# Patient Record
Sex: MALE | Race: BLACK OR AFRICAN AMERICAN
[De-identification: names, ages, dates, MRNs, and addresses within clinical notes are randomized per-mention and may not be internally consistent; named-entity substitution may affect disease eponyms.]

---

## 2017-02-11 ENCOUNTER — HOSPITAL ENCOUNTER (INPATIENT)
Dept: HOSPITAL 74 - YASAS | Age: 27
LOS: 5 days | Discharge: HOME | End: 2017-02-16
Attending: INTERNAL MEDICINE | Admitting: INTERNAL MEDICINE
Payer: COMMERCIAL

## 2017-02-11 VITALS — BODY MASS INDEX: 22.3 KG/M2

## 2017-02-11 DIAGNOSIS — F19.24: ICD-10-CM

## 2017-02-11 DIAGNOSIS — F10.230: Primary | ICD-10-CM

## 2017-02-11 DIAGNOSIS — F17.213: ICD-10-CM

## 2017-02-11 RX ADMIN — NICOTINE SCH: 14 PATCH, EXTENDED RELEASE TRANSDERMAL at 22:37

## 2017-02-11 RX ADMIN — Medication SCH MG: at 22:37

## 2017-02-12 LAB
ALBUMIN SERPL-MCNC: 4.2 G/DL (ref 3.4–5)
ALP SERPL-CCNC: 62 U/L (ref 45–117)
ALT SERPL-CCNC: 43 U/L (ref 12–78)
ANION GAP SERPL CALC-SCNC: 10 MMOL/L (ref 8–16)
AST SERPL-CCNC: 20 U/L (ref 15–37)
BILIRUB SERPL-MCNC: 0.7 MG/DL (ref 0.2–1)
CALCIUM SERPL-MCNC: 9.5 MG/DL (ref 8.5–10.1)
CO2 SERPL-SCNC: 28 MMOL/L (ref 21–32)
CREAT SERPL-MCNC: 1 MG/DL (ref 0.7–1.3)
DEPRECATED RDW RBC AUTO: 12.3 % (ref 11.9–15.9)
GLUCOSE SERPL-MCNC: 81 MG/DL (ref 74–106)
MCH RBC QN AUTO: 32 PG (ref 25.7–33.7)
MCHC RBC AUTO-ENTMCNC: 33.9 G/DL (ref 32–35.9)
MCV RBC: 94.5 FL (ref 80–96)
PLATELET # BLD AUTO: 250 K/MM3 (ref 134–434)
PMV BLD: 7.6 FL (ref 7.5–11.1)
PROT SERPL-MCNC: 7 G/DL (ref 6.4–8.2)
WBC # BLD AUTO: 5.9 K/MM3 (ref 4–10)

## 2017-02-12 RX ADMIN — ACETAMINOPHEN PRN MG: 325 TABLET ORAL at 06:00

## 2017-02-12 RX ADMIN — IBUPROFEN PRN MG: 400 TABLET, FILM COATED ORAL at 16:30

## 2017-02-12 RX ADMIN — Medication SCH MG: at 22:41

## 2017-02-12 RX ADMIN — Medication SCH TAB: at 10:26

## 2017-02-12 RX ADMIN — NICOTINE SCH MG: 14 PATCH, EXTENDED RELEASE TRANSDERMAL at 10:26

## 2017-02-13 LAB
APPEARANCE UR: (no result)
BILIRUB UR STRIP.AUTO-MCNC: NEGATIVE MG/DL
COLOR UR: YELLOW
KETONES UR QL STRIP: NEGATIVE
LEUKOCYTE ESTERASE UR QL STRIP.AUTO: NEGATIVE
NITRITE UR QL STRIP: NEGATIVE
PH UR: 5 [PH] (ref 5–8)
PROT UR QL STRIP: NEGATIVE
PROT UR QL STRIP: NEGATIVE
RBC # UR STRIP: NEGATIVE /UL
SP GR UR: 1.03 (ref 1–1.03)
UROBILINOGEN UR STRIP-MCNC: NEGATIVE E.U./DL (ref 0.2–1)

## 2017-02-13 RX ADMIN — ACETAMINOPHEN PRN MG: 325 TABLET ORAL at 23:12

## 2017-02-13 RX ADMIN — NICOTINE SCH MG: 14 PATCH, EXTENDED RELEASE TRANSDERMAL at 10:26

## 2017-02-13 RX ADMIN — Medication SCH TAB: at 10:25

## 2017-02-13 RX ADMIN — ACETAMINOPHEN PRN MG: 325 TABLET ORAL at 17:40

## 2017-02-13 RX ADMIN — Medication SCH MG: at 22:18

## 2017-02-13 RX ADMIN — ALUMINUM HYDROXIDE, MAGNESIUM HYDROXIDE, AND SIMETHICONE PRN ML: 200; 200; 20 SUSPENSION ORAL at 06:05

## 2017-02-14 RX ADMIN — ALUMINUM HYDROXIDE, MAGNESIUM HYDROXIDE, AND SIMETHICONE PRN ML: 200; 200; 20 SUSPENSION ORAL at 05:53

## 2017-02-14 RX ADMIN — SERTRALINE HYDROCHLORIDE SCH MG: 50 TABLET ORAL at 10:19

## 2017-02-14 RX ADMIN — NICOTINE SCH MG: 14 PATCH, EXTENDED RELEASE TRANSDERMAL at 10:19

## 2017-02-14 RX ADMIN — IBUPROFEN PRN MG: 400 TABLET, FILM COATED ORAL at 17:40

## 2017-02-14 RX ADMIN — ACETAMINOPHEN PRN MG: 325 TABLET ORAL at 15:33

## 2017-02-14 RX ADMIN — Medication SCH MG: at 22:19

## 2017-02-14 RX ADMIN — Medication SCH TAB: at 10:19

## 2017-02-14 RX ADMIN — ACETAMINOPHEN PRN MG: 325 TABLET ORAL at 05:52

## 2017-02-15 ENCOUNTER — HOSPITAL ENCOUNTER (EMERGENCY)
Dept: HOSPITAL 74 - JER | Age: 27
Discharge: HOME | End: 2017-02-15
Payer: COMMERCIAL

## 2017-02-15 VITALS — SYSTOLIC BLOOD PRESSURE: 107 MMHG | TEMPERATURE: 97.3 F | HEART RATE: 60 BPM | DIASTOLIC BLOOD PRESSURE: 63 MMHG

## 2017-02-15 VITALS — BODY MASS INDEX: 29.2 KG/M2

## 2017-02-15 DIAGNOSIS — F13.10: ICD-10-CM

## 2017-02-15 DIAGNOSIS — F17.210: ICD-10-CM

## 2017-02-15 DIAGNOSIS — R07.9: Primary | ICD-10-CM

## 2017-02-15 DIAGNOSIS — F10.10: ICD-10-CM

## 2017-02-15 LAB
ALBUMIN SERPL-MCNC: 3.7 G/DL (ref 3.4–5)
ALP SERPL-CCNC: 82 U/L (ref 45–117)
ALT SERPL-CCNC: 305 U/L (ref 12–78)
ANION GAP SERPL CALC-SCNC: 9 MMOL/L (ref 8–16)
AST SERPL-CCNC: 198 U/L (ref 15–37)
BASOPHILS # BLD: 0.7 % (ref 0–2)
BILIRUB SERPL-MCNC: 0.3 MG/DL (ref 0.2–1)
CALCIUM SERPL-MCNC: 8.7 MG/DL (ref 8.5–10.1)
CO2 SERPL-SCNC: 27 MMOL/L (ref 21–32)
CREAT SERPL-MCNC: 0.8 MG/DL (ref 0.7–1.3)
DEPRECATED RDW RBC AUTO: 12.6 % (ref 11.9–15.9)
EOSINOPHIL # BLD: 1.1 % (ref 0–4.5)
GLUCOSE SERPL-MCNC: 92 MG/DL (ref 74–106)
INR BLD: 0.9 (ref 0.82–1.09)
MAGNESIUM SERPL-MCNC: 2 MG/DL (ref 1.8–2.4)
MCH RBC QN AUTO: 32.1 PG (ref 25.7–33.7)
MCHC RBC AUTO-ENTMCNC: 34.6 G/DL (ref 32–35.9)
MCV RBC: 92.9 FL (ref 80–96)
NEUTROPHILS # BLD: 63.5 % (ref 42.8–82.8)
PLATELET # BLD AUTO: 208 K/MM3 (ref 134–434)
PMV BLD: 7.5 FL (ref 7.5–11.1)
PROT SERPL-MCNC: 6.4 G/DL (ref 6.4–8.2)
PT PNL PPP: 9.9 SEC (ref 9.98–11.88)
TROPONIN I SERPL-MCNC: < 0.02 NG/ML (ref 0–0.05)
TROPONIN I SERPL-MCNC: < 0.02 NG/ML (ref 0–0.05)
WBC # BLD AUTO: 5 K/MM3 (ref 4–10)

## 2017-02-15 RX ADMIN — IBUPROFEN PRN MG: 400 TABLET, FILM COATED ORAL at 17:50

## 2017-02-15 RX ADMIN — Medication SCH TAB: at 10:07

## 2017-02-15 RX ADMIN — NICOTINE SCH MG: 14 PATCH, EXTENDED RELEASE TRANSDERMAL at 10:07

## 2017-02-15 RX ADMIN — Medication SCH MG: at 22:16

## 2017-02-15 RX ADMIN — SERTRALINE HYDROCHLORIDE SCH MG: 50 TABLET ORAL at 10:07

## 2017-02-16 VITALS — DIASTOLIC BLOOD PRESSURE: 65 MMHG | TEMPERATURE: 97.2 F | SYSTOLIC BLOOD PRESSURE: 109 MMHG

## 2017-02-16 VITALS — HEART RATE: 65 BPM

## 2017-02-16 PROCEDURE — HZ2ZZZZ DETOXIFICATION SERVICES FOR SUBSTANCE ABUSE TREATMENT: ICD-10-PCS | Performed by: INTERNAL MEDICINE

## 2017-02-16 RX ADMIN — NICOTINE SCH MG: 14 PATCH, EXTENDED RELEASE TRANSDERMAL at 10:29

## 2017-02-16 RX ADMIN — SERTRALINE HYDROCHLORIDE SCH MG: 50 TABLET ORAL at 10:29

## 2017-02-16 RX ADMIN — Medication SCH TAB: at 10:29

## 2017-03-29 ENCOUNTER — EMERGENCY (EMERGENCY)
Facility: HOSPITAL | Age: 27
LOS: 1 days | Discharge: HOME | End: 2017-03-29
Attending: EMERGENCY MEDICINE | Admitting: EMERGENCY MEDICINE
Payer: MEDICAID

## 2017-03-29 VITALS
DIASTOLIC BLOOD PRESSURE: 84 MMHG | RESPIRATION RATE: 16 BRPM | SYSTOLIC BLOOD PRESSURE: 128 MMHG | OXYGEN SATURATION: 95 % | TEMPERATURE: 98 F | HEART RATE: 84 BPM

## 2017-03-29 VITALS
TEMPERATURE: 98 F | HEART RATE: 72 BPM | DIASTOLIC BLOOD PRESSURE: 57 MMHG | SYSTOLIC BLOOD PRESSURE: 109 MMHG | RESPIRATION RATE: 16 BRPM | OXYGEN SATURATION: 98 %

## 2017-03-29 LAB
ALBUMIN SERPL ELPH-MCNC: 4.4 G/DL — SIGNIFICANT CHANGE UP (ref 3.3–5)
ALP SERPL-CCNC: 54 U/L — SIGNIFICANT CHANGE UP (ref 40–120)
ALT FLD-CCNC: 20 U/L — SIGNIFICANT CHANGE UP (ref 4–41)
APAP SERPL-MCNC: < 15 UG/ML — LOW (ref 15–25)
AST SERPL-CCNC: 22 U/L — SIGNIFICANT CHANGE UP (ref 4–40)
BARBITURATES MEASUREMENT: NEGATIVE — SIGNIFICANT CHANGE UP
BASOPHILS # BLD AUTO: 0.01 K/UL — SIGNIFICANT CHANGE UP (ref 0–0.2)
BASOPHILS NFR BLD AUTO: 0.2 % — SIGNIFICANT CHANGE UP (ref 0–2)
BENZODIAZ SERPL-MCNC: NEGATIVE — SIGNIFICANT CHANGE UP
BILIRUB SERPL-MCNC: 0.4 MG/DL — SIGNIFICANT CHANGE UP (ref 0.2–1.2)
BUN SERPL-MCNC: 14 MG/DL — SIGNIFICANT CHANGE UP (ref 7–23)
CALCIUM SERPL-MCNC: 8.6 MG/DL — SIGNIFICANT CHANGE UP (ref 8.4–10.5)
CHLORIDE SERPL-SCNC: 100 MMOL/L — SIGNIFICANT CHANGE UP (ref 98–107)
CO2 SERPL-SCNC: 23 MMOL/L — SIGNIFICANT CHANGE UP (ref 22–31)
CREAT SERPL-MCNC: 0.92 MG/DL — SIGNIFICANT CHANGE UP (ref 0.5–1.3)
EOSINOPHIL # BLD AUTO: 0.06 K/UL — SIGNIFICANT CHANGE UP (ref 0–0.5)
EOSINOPHIL NFR BLD AUTO: 1.3 % — SIGNIFICANT CHANGE UP (ref 0–6)
ETHANOL BLD-MCNC: 239 MG/DL — HIGH
GLUCOSE SERPL-MCNC: 93 MG/DL — SIGNIFICANT CHANGE UP (ref 70–99)
HCT VFR BLD CALC: 41.1 % — SIGNIFICANT CHANGE UP (ref 39–50)
HGB BLD-MCNC: 14.2 G/DL — SIGNIFICANT CHANGE UP (ref 13–17)
IMM GRANULOCYTES NFR BLD AUTO: 0.2 % — SIGNIFICANT CHANGE UP (ref 0–1.5)
LYMPHOCYTES # BLD AUTO: 1.87 K/UL — SIGNIFICANT CHANGE UP (ref 1–3.3)
LYMPHOCYTES # BLD AUTO: 41.5 % — SIGNIFICANT CHANGE UP (ref 13–44)
MCHC RBC-ENTMCNC: 31.8 PG — SIGNIFICANT CHANGE UP (ref 27–34)
MCHC RBC-ENTMCNC: 34.5 % — SIGNIFICANT CHANGE UP (ref 32–36)
MCV RBC AUTO: 91.9 FL — SIGNIFICANT CHANGE UP (ref 80–100)
MONOCYTES # BLD AUTO: 0.19 K/UL — SIGNIFICANT CHANGE UP (ref 0–0.9)
MONOCYTES NFR BLD AUTO: 4.2 % — SIGNIFICANT CHANGE UP (ref 2–14)
NEUTROPHILS # BLD AUTO: 2.37 K/UL — SIGNIFICANT CHANGE UP (ref 1.8–7.4)
NEUTROPHILS NFR BLD AUTO: 52.6 % — SIGNIFICANT CHANGE UP (ref 43–77)
PLATELET # BLD AUTO: 292 K/UL — SIGNIFICANT CHANGE UP (ref 150–400)
PMV BLD: 9.3 FL — SIGNIFICANT CHANGE UP (ref 7–13)
POTASSIUM SERPL-MCNC: 3.7 MMOL/L — SIGNIFICANT CHANGE UP (ref 3.5–5.3)
POTASSIUM SERPL-SCNC: 3.7 MMOL/L — SIGNIFICANT CHANGE UP (ref 3.5–5.3)
PROT SERPL-MCNC: 7 G/DL — SIGNIFICANT CHANGE UP (ref 6–8.3)
RBC # BLD: 4.47 M/UL — SIGNIFICANT CHANGE UP (ref 4.2–5.8)
RBC # FLD: 12.1 % — SIGNIFICANT CHANGE UP (ref 10.3–14.5)
SALICYLATES SERPL-MCNC: < 5 MG/DL — LOW (ref 15–30)
SODIUM SERPL-SCNC: 144 MMOL/L — SIGNIFICANT CHANGE UP (ref 135–145)
TSH SERPL-MCNC: 0.91 UIU/ML — SIGNIFICANT CHANGE UP (ref 0.27–4.2)
WBC # BLD: 4.51 K/UL — SIGNIFICANT CHANGE UP (ref 3.8–10.5)
WBC # FLD AUTO: 4.51 K/UL — SIGNIFICANT CHANGE UP (ref 3.8–10.5)

## 2017-03-29 PROCEDURE — 99284 EMERGENCY DEPT VISIT MOD MDM: CPT | Mod: 25

## 2017-03-29 RX ORDER — DIPHENHYDRAMINE HCL 50 MG
50 CAPSULE ORAL ONCE
Qty: 0 | Refills: 0 | Status: COMPLETED | OUTPATIENT
Start: 2017-03-29 | End: 2017-03-29

## 2017-03-29 RX ORDER — ACETAMINOPHEN 500 MG
650 TABLET ORAL ONCE
Qty: 0 | Refills: 0 | Status: COMPLETED | OUTPATIENT
Start: 2017-03-29 | End: 2017-03-29

## 2017-03-29 RX ORDER — HALOPERIDOL DECANOATE 100 MG/ML
5 INJECTION INTRAMUSCULAR ONCE
Qty: 0 | Refills: 0 | Status: COMPLETED | OUTPATIENT
Start: 2017-03-29 | End: 2017-03-29

## 2017-03-29 RX ADMIN — Medication 650 MILLIGRAM(S): at 18:41

## 2017-03-29 RX ADMIN — Medication 2 MILLIGRAM(S): at 00:35

## 2017-03-29 RX ADMIN — HALOPERIDOL DECANOATE 5 MILLIGRAM(S): 100 INJECTION INTRAMUSCULAR at 00:35

## 2017-03-29 RX ADMIN — Medication 50 MILLIGRAM(S): at 00:35

## 2017-03-29 NOTE — ED PROVIDER NOTE - OBJECTIVE STATEMENT
Pt sedated and gives limited history. Per triage note pt has h/o Bipolar d/o, depression and anxiety was BIBEMS after police called to a convenience store for abnormal behavior.

## 2017-03-29 NOTE — ED BEHAVIORAL HEALTH NOTE - BEHAVIORAL HEALTH NOTE
SW informed by treating MD that pt is stable for d/c and safe to travel via [place]. SW provided [place] serial # [id number].     SW additionally informed that pt is requesting referrals for substance rehab programs. SW provided list of referrals to pt and explained intake process. Pt stated he was interested in [facility], as he has been there previously. Per [facility], admission is on a first come, first serve basis, which SW informed pt of.

## 2017-03-29 NOTE — ED PROVIDER NOTE - MEDICAL DECISION MAKING DETAILS
History limited by sedation. [facility] for psych evaluation with report of psychiatric history. Pending psych clearance and labs.

## 2017-03-29 NOTE — ED PROVIDER NOTE - PROGRESS NOTE DETAILS
Pt was initially evaluated overnight and medically clear-  Pt is currently sober, requesting to go to detox stating that he has been at [facility] for rehab in the past.  Pt is alert and oriented ambulating w/ steady gait, tolerated PO-  VS stable, no report of nausea or vomiting, no abd pain- Denies HI/SI, no AVH.  to facilitate w/ transportation- Pt was initially evaluated overnight and medically clear-  Pt is currently sober, requesting to go to detox stating that he has been at [facility] for rehab in the past.  Pt is alert and oriented ambulating w/ steady gait, tolerated PO-  VS stable, no report of nausea or vomiting, no abd pain- Denies HI/SI, no AVH. No narrative suggestive of etoh withdrawal-   to facilitate w/ transportation- Rodney attg: patient clinically sober, labs wnl. Ctabl, rrr, s1s2, abd soft ntnd, neg spinal tenderness, gait steady neg ataxia. Request something strong for his back, tylenol 650mg po x 1.

## 2017-03-29 NOTE — ED PROVIDER NOTE - CARE PLAN
Principal Discharge DX:	Abnormal behavior Principal Discharge DX:	Abnormal behavior  Secondary Diagnosis:	Alcohol intoxication

## 2017-03-29 NOTE — ED PROVIDER NOTE - NS ED ATTENDING STATEMENT MOD
Attending Only I have reviewed and agree with all pertinent clinical information, including history and physical exam and agree with treatment plan of the NP. I have personally performed a face to face diagnostic evaluation on this patient. I have reviewed the NP note and agree with the history, exam, and plan of care, except as noted.

## 2017-03-29 NOTE — ED ADULT TRIAGE NOTE - CHIEF COMPLAINT QUOTE
Pt brought in by EMS from street after being kicked out of convenience store. States hx of bipolar, anxiety, depression & unknown compliance with rx. Pt denies ETOH, drug abuse. Pt calm in triage but somewhat uncooperative.

## 2017-03-29 NOTE — ED BEHAVIORAL HEALTH NOTE - BEHAVIORAL HEALTH NOTE
Writer called the Patient , [place] [phone], to request clothing for the patient to be discharged in, as his clothing was discarded after arrival. She provided paper scrubs.

## 2017-05-24 PROBLEM — Z00.00 ENCOUNTER FOR PREVENTIVE HEALTH EXAMINATION: Status: ACTIVE | Noted: 2017-05-24

## 2017-09-13 ENCOUNTER — HOSPITAL ENCOUNTER (INPATIENT)
Dept: HOSPITAL 74 - YASAS | Age: 27
LOS: 5 days | Discharge: HOME | End: 2017-09-18
Attending: INTERNAL MEDICINE | Admitting: INTERNAL MEDICINE
Payer: COMMERCIAL

## 2017-09-13 VITALS — BODY MASS INDEX: 23.7 KG/M2

## 2017-09-13 DIAGNOSIS — F10.230: Primary | ICD-10-CM

## 2017-09-13 DIAGNOSIS — E78.00: ICD-10-CM

## 2017-09-13 DIAGNOSIS — F17.210: ICD-10-CM

## 2017-09-13 DIAGNOSIS — G40.509: ICD-10-CM

## 2017-09-13 DIAGNOSIS — F19.24: ICD-10-CM

## 2017-09-13 PROCEDURE — HZ2ZZZZ DETOXIFICATION SERVICES FOR SUBSTANCE ABUSE TREATMENT: ICD-10-PCS | Performed by: INTERNAL MEDICINE

## 2017-09-13 RX ADMIN — NICOTINE SCH: 21 PATCH TRANSDERMAL at 23:42

## 2017-09-13 RX ADMIN — Medication SCH MG: at 23:38

## 2017-09-14 LAB
ALBUMIN SERPL-MCNC: 3.6 G/DL (ref 3.4–5)
ALP SERPL-CCNC: 73 U/L (ref 45–117)
ALT SERPL-CCNC: 25 U/L (ref 12–78)
ANION GAP SERPL CALC-SCNC: 5 MMOL/L (ref 8–16)
APPEARANCE UR: CLEAR
AST SERPL-CCNC: 13 U/L (ref 15–37)
BILIRUB SERPL-MCNC: 0.4 MG/DL (ref 0.2–1)
BILIRUB UR STRIP.AUTO-MCNC: NEGATIVE MG/DL
CALCIUM SERPL-MCNC: 8.6 MG/DL (ref 8.5–10.1)
CO2 SERPL-SCNC: 29 MMOL/L (ref 21–32)
COLOR UR: YELLOW
CREAT SERPL-MCNC: 0.8 MG/DL (ref 0.7–1.3)
DEPRECATED RDW RBC AUTO: 12.5 % (ref 11.9–15.9)
GLUCOSE SERPL-MCNC: 91 MG/DL (ref 74–106)
KETONES UR QL STRIP: NEGATIVE
LEUKOCYTE ESTERASE UR QL STRIP.AUTO: NEGATIVE
MCH RBC QN AUTO: 32 PG (ref 25.7–33.7)
MCHC RBC AUTO-ENTMCNC: 34 G/DL (ref 32–35.9)
MCV RBC: 93.9 FL (ref 80–96)
NITRITE UR QL STRIP: NEGATIVE
PH UR: 6 [PH] (ref 5–8)
PLATELET # BLD AUTO: 237 K/MM3 (ref 134–434)
PMV BLD: 7.4 FL (ref 7.5–11.1)
PROT SERPL-MCNC: 6.4 G/DL (ref 6.4–8.2)
PROT UR QL STRIP: NEGATIVE
PROT UR QL STRIP: NEGATIVE
RBC # UR STRIP: NEGATIVE /UL
SP GR UR: 1.02 (ref 1–1.02)
UROBILINOGEN UR STRIP-MCNC: 2 MG/DL (ref 0.2–1)
WBC # BLD AUTO: 5.1 K/MM3 (ref 4–10)

## 2017-09-14 RX ADMIN — Medication SCH TAB: at 11:03

## 2017-09-14 RX ADMIN — HYDROXYZINE PAMOATE PRN MG: 50 CAPSULE ORAL at 11:04

## 2017-09-14 RX ADMIN — NICOTINE SCH MG: 21 PATCH TRANSDERMAL at 11:02

## 2017-09-14 RX ADMIN — Medication SCH MG: at 22:22

## 2017-09-15 RX ADMIN — ALUMINUM HYDROXIDE, MAGNESIUM HYDROXIDE, AND SIMETHICONE PRN ML: 200; 200; 20 SUSPENSION ORAL at 14:50

## 2017-09-15 RX ADMIN — Medication SCH MG: at 22:58

## 2017-09-15 RX ADMIN — NICOTINE SCH MG: 21 PATCH TRANSDERMAL at 10:50

## 2017-09-15 RX ADMIN — Medication SCH TAB: at 10:50

## 2017-09-16 RX ADMIN — Medication SCH MG: at 22:28

## 2017-09-16 RX ADMIN — NICOTINE SCH MG: 21 PATCH TRANSDERMAL at 11:09

## 2017-09-16 RX ADMIN — ALUMINUM HYDROXIDE, MAGNESIUM HYDROXIDE, AND SIMETHICONE PRN ML: 200; 200; 20 SUSPENSION ORAL at 20:04

## 2017-09-16 RX ADMIN — HYDROXYZINE PAMOATE PRN MG: 50 CAPSULE ORAL at 15:38

## 2017-09-16 RX ADMIN — Medication SCH TAB: at 11:08

## 2017-09-16 RX ADMIN — RANITIDINE SCH MG: 150 TABLET ORAL at 22:28

## 2017-09-17 RX ADMIN — Medication SCH MG: at 22:44

## 2017-09-17 RX ADMIN — ACETAMINOPHEN PRN MG: 325 TABLET ORAL at 19:00

## 2017-09-17 RX ADMIN — RANITIDINE SCH MG: 150 TABLET ORAL at 10:53

## 2017-09-17 RX ADMIN — NICOTINE SCH MG: 21 PATCH TRANSDERMAL at 10:54

## 2017-09-17 RX ADMIN — ACETAMINOPHEN PRN MG: 325 TABLET ORAL at 23:15

## 2017-09-17 RX ADMIN — Medication SCH TAB: at 10:53

## 2017-09-17 RX ADMIN — ACETAMINOPHEN PRN MG: 325 TABLET ORAL at 10:56

## 2017-09-17 RX ADMIN — RANITIDINE SCH MG: 150 TABLET ORAL at 23:13

## 2017-09-17 RX ADMIN — ACETAMINOPHEN PRN MG: 325 TABLET ORAL at 01:52

## 2017-09-18 VITALS — HEART RATE: 69 BPM | SYSTOLIC BLOOD PRESSURE: 121 MMHG | TEMPERATURE: 97.1 F | DIASTOLIC BLOOD PRESSURE: 72 MMHG

## 2017-09-18 RX ADMIN — RANITIDINE SCH: 150 TABLET ORAL at 13:50

## 2017-09-18 RX ADMIN — ACETAMINOPHEN PRN MG: 325 TABLET ORAL at 04:44

## 2017-09-18 RX ADMIN — RANITIDINE SCH MG: 150 TABLET ORAL at 14:05

## 2017-09-18 RX ADMIN — Medication SCH TAB: at 09:38

## 2017-09-18 RX ADMIN — NICOTINE SCH: 21 PATCH TRANSDERMAL at 09:38

## 2017-09-18 RX ADMIN — HYDROXYZINE PAMOATE PRN MG: 50 CAPSULE ORAL at 04:38

## 2018-06-21 ENCOUNTER — HOSPITAL ENCOUNTER (INPATIENT)
Dept: HOSPITAL 74 - YASAS | Age: 28
LOS: 4 days | Discharge: HOME | DRG: 773 | End: 2018-06-25
Attending: INTERNAL MEDICINE | Admitting: INTERNAL MEDICINE
Payer: COMMERCIAL

## 2018-06-21 VITALS — BODY MASS INDEX: 27.5 KG/M2

## 2018-06-21 DIAGNOSIS — K21.9: ICD-10-CM

## 2018-06-21 DIAGNOSIS — F13.230: ICD-10-CM

## 2018-06-21 DIAGNOSIS — F11.23: Primary | ICD-10-CM

## 2018-06-21 DIAGNOSIS — F10.230: ICD-10-CM

## 2018-06-21 DIAGNOSIS — F17.213: ICD-10-CM

## 2018-06-21 DIAGNOSIS — F41.9: ICD-10-CM

## 2018-06-21 DIAGNOSIS — Z91.5: ICD-10-CM

## 2018-06-21 DIAGNOSIS — F19.24: ICD-10-CM

## 2018-06-21 DIAGNOSIS — Z86.69: ICD-10-CM

## 2018-06-21 PROCEDURE — HZ2ZZZZ DETOXIFICATION SERVICES FOR SUBSTANCE ABUSE TREATMENT: ICD-10-PCS | Performed by: INTERNAL MEDICINE

## 2018-06-21 RX ADMIN — Medication SCH MG: at 23:54

## 2018-06-22 LAB
ALBUMIN SERPL-MCNC: 3.4 G/DL (ref 3.4–5)
ALP SERPL-CCNC: 59 U/L (ref 45–117)
ALT SERPL-CCNC: 38 U/L (ref 12–78)
ANION GAP SERPL CALC-SCNC: 7 MMOL/L (ref 8–16)
APPEARANCE UR: CLEAR
AST SERPL-CCNC: 20 U/L (ref 15–37)
BILIRUB SERPL-MCNC: 0.3 MG/DL (ref 0.2–1)
BILIRUB UR STRIP.AUTO-MCNC: NEGATIVE MG/DL
BUN SERPL-MCNC: 14 MG/DL (ref 7–18)
CALCIUM SERPL-MCNC: 8.3 MG/DL (ref 8.5–10.1)
CHLORIDE SERPL-SCNC: 104 MMOL/L (ref 98–107)
CO2 SERPL-SCNC: 31 MMOL/L (ref 21–32)
COLOR UR: COLORLESS
CREAT SERPL-MCNC: 1.1 MG/DL (ref 0.7–1.3)
DEPRECATED RDW RBC AUTO: 12.6 % (ref 11.9–15.9)
GLUCOSE SERPL-MCNC: 78 MG/DL (ref 74–106)
HCT VFR BLD CALC: 39.5 % (ref 35.4–49)
HGB BLD-MCNC: 13.3 GM/DL (ref 11.7–16.9)
KETONES UR QL STRIP: NEGATIVE
LEUKOCYTE ESTERASE UR QL STRIP.AUTO: NEGATIVE
MCH RBC QN AUTO: 32.1 PG (ref 25.7–33.7)
MCHC RBC AUTO-ENTMCNC: 33.7 G/DL (ref 32–35.9)
MCV RBC: 95.1 FL (ref 80–96)
NITRITE UR QL STRIP: NEGATIVE
PH UR: 6 [PH] (ref 5–8)
PLATELET # BLD AUTO: 310 K/MM3 (ref 134–434)
PMV BLD: 7.6 FL (ref 7.5–11.1)
POTASSIUM SERPLBLD-SCNC: 4.7 MMOL/L (ref 3.5–5.1)
PROT SERPL-MCNC: 6.4 G/DL (ref 6.4–8.2)
PROT UR QL STRIP: NEGATIVE
PROT UR QL STRIP: NEGATIVE
RBC # BLD AUTO: 4.16 M/MM3 (ref 4–5.6)
SODIUM SERPL-SCNC: 142 MMOL/L (ref 136–145)
SP GR UR: 1 (ref 1–1.03)
UROBILINOGEN UR STRIP-MCNC: NEGATIVE MG/DL (ref 0.2–1)
WBC # BLD AUTO: 5.5 K/MM3 (ref 4–10)

## 2018-06-22 RX ADMIN — ALUMINUM HYDROXIDE, MAGNESIUM HYDROXIDE, AND SIMETHICONE PRN ML: 200; 200; 20 SUSPENSION ORAL at 07:55

## 2018-06-22 RX ADMIN — Medication SCH MG: at 22:23

## 2018-06-22 RX ADMIN — CYCLOBENZAPRINE HYDROCHLORIDE SCH MG: 10 TABLET, FILM COATED ORAL at 22:23

## 2018-06-22 RX ADMIN — Medication SCH TAB: at 10:33

## 2018-06-22 RX ADMIN — CYCLOBENZAPRINE HYDROCHLORIDE SCH: 10 TABLET, FILM COATED ORAL at 16:16

## 2018-06-22 RX ADMIN — NICOTINE SCH: 14 PATCH, EXTENDED RELEASE TRANSDERMAL at 10:34

## 2018-06-23 RX ADMIN — Medication SCH MG: at 22:52

## 2018-06-23 RX ADMIN — CYCLOBENZAPRINE HYDROCHLORIDE SCH MG: 10 TABLET, FILM COATED ORAL at 22:52

## 2018-06-23 RX ADMIN — CYCLOBENZAPRINE HYDROCHLORIDE SCH MG: 10 TABLET, FILM COATED ORAL at 05:47

## 2018-06-23 RX ADMIN — CYCLOBENZAPRINE HYDROCHLORIDE SCH MG: 10 TABLET, FILM COATED ORAL at 15:11

## 2018-06-23 RX ADMIN — Medication SCH TAB: at 10:24

## 2018-06-23 RX ADMIN — NICOTINE SCH: 14 PATCH, EXTENDED RELEASE TRANSDERMAL at 14:27

## 2018-06-23 RX ADMIN — METHADONE HYDROCHLORIDE SCH MG: 5 TABLET ORAL at 10:24

## 2018-06-24 RX ADMIN — METHADONE HYDROCHLORIDE SCH MG: 5 TABLET ORAL at 10:09

## 2018-06-24 RX ADMIN — Medication SCH TAB: at 10:09

## 2018-06-24 RX ADMIN — RANITIDINE SCH MG: 150 TABLET ORAL at 11:13

## 2018-06-24 RX ADMIN — CYCLOBENZAPRINE HYDROCHLORIDE SCH MG: 10 TABLET, FILM COATED ORAL at 06:07

## 2018-06-24 RX ADMIN — ACETAMINOPHEN PRN MG: 325 TABLET ORAL at 17:23

## 2018-06-24 RX ADMIN — ALUMINUM HYDROXIDE, MAGNESIUM HYDROXIDE, AND SIMETHICONE PRN ML: 200; 200; 20 SUSPENSION ORAL at 03:34

## 2018-06-24 RX ADMIN — CYCLOBENZAPRINE HYDROCHLORIDE SCH: 10 TABLET, FILM COATED ORAL at 15:30

## 2018-06-24 RX ADMIN — ACETAMINOPHEN PRN MG: 325 TABLET ORAL at 10:28

## 2018-06-24 RX ADMIN — NICOTINE SCH MG: 14 PATCH, EXTENDED RELEASE TRANSDERMAL at 10:09

## 2018-06-25 VITALS — SYSTOLIC BLOOD PRESSURE: 114 MMHG | TEMPERATURE: 98 F | HEART RATE: 78 BPM | DIASTOLIC BLOOD PRESSURE: 57 MMHG

## 2018-06-25 RX ADMIN — Medication SCH TAB: at 09:59

## 2018-06-25 RX ADMIN — RANITIDINE SCH MG: 150 TABLET ORAL at 09:59

## 2018-06-25 RX ADMIN — Medication SCH: at 08:31

## 2018-06-25 RX ADMIN — RANITIDINE SCH: 150 TABLET ORAL at 08:31

## 2018-06-25 RX ADMIN — NICOTINE SCH MG: 14 PATCH, EXTENDED RELEASE TRANSDERMAL at 09:59

## 2018-06-25 RX ADMIN — CYCLOBENZAPRINE HYDROCHLORIDE SCH: 10 TABLET, FILM COATED ORAL at 08:31

## 2018-06-25 RX ADMIN — CYCLOBENZAPRINE HYDROCHLORIDE SCH MG: 10 TABLET, FILM COATED ORAL at 06:01

## 2018-11-01 ENCOUNTER — OUTPATIENT (OUTPATIENT)
Dept: OUTPATIENT SERVICES | Facility: HOSPITAL | Age: 28
LOS: 1 days | End: 2018-11-01
Payer: MEDICAID

## 2018-11-01 PROCEDURE — G9001: CPT

## 2018-11-08 DIAGNOSIS — Z71.89 OTHER SPECIFIED COUNSELING: ICD-10-CM

## 2018-11-09 PROBLEM — F39 UNSPECIFIED MOOD [AFFECTIVE] DISORDER: Chronic | Status: ACTIVE | Noted: 2017-03-29

## 2024-01-25 ENCOUNTER — EMERGENCY (EMERGENCY)
Facility: HOSPITAL | Age: 34
LOS: 1 days | Discharge: HOME | End: 2024-01-25
Admitting: EMERGENCY MEDICINE
Payer: SELF-PAY

## 2024-01-25 VITALS
RESPIRATION RATE: 16 BRPM | TEMPERATURE: 98 F | HEART RATE: 78 BPM | OXYGEN SATURATION: 98 % | HEIGHT: 71 IN | WEIGHT: 225.09 LBS | SYSTOLIC BLOOD PRESSURE: 141 MMHG | DIASTOLIC BLOOD PRESSURE: 81 MMHG

## 2024-01-25 PROCEDURE — 99053 MED SERV 10PM-8AM 24 HR FAC: CPT

## 2024-01-25 PROCEDURE — 99284 EMERGENCY DEPT VISIT MOD MDM: CPT

## 2024-01-26 PROCEDURE — 73630 X-RAY EXAM OF FOOT: CPT | Mod: 26,RT

## 2024-01-26 RX ORDER — IBUPROFEN 200 MG
600 TABLET ORAL ONCE
Refills: 0 | Status: COMPLETED | OUTPATIENT
Start: 2024-01-26 | End: 2024-01-26

## 2024-01-26 RX ADMIN — Medication 600 MILLIGRAM(S): at 01:02

## 2024-01-26 NOTE — ED PROVIDER NOTE - CLINICAL SUMMARY MEDICAL DECISION MAKING FREE TEXT BOX
well appearing pt here with acute onset of R foot pain, normal exam no signs of trauma or ttp, plan: xr, nsiads

## 2024-01-26 NOTE — ED PROVIDER NOTE - PATIENT PORTAL LINK FT
You can access the FollowMyHealth Patient Portal offered by [facility] by registering at the following website: [email or web address]. By joining [facility]’s FollowMyHealth portal, you will also be able to view your health information using other applications (apps) compatible with our system.

## 2024-01-26 NOTE — ED ADULT NURSE NOTE - NSFALLUNIVINTERV_ED_ALL_ED
Bed/Stretcher in lowest position, wheels locked, appropriate side rails in place/Call bell, personal items and telephone in reach/Instruct patient to call for assistance before getting out of bed/chair/stretcher/Non-slip footwear applied when patient is off stretcher/[place] to call system/Physically safe environment - no spills, clutter or unnecessary equipment/Purposeful proactive rounding/Room/bathroom lighting operational, light cord in reach

## 2024-01-26 NOTE — ED PROVIDER NOTE - PHYSICAL EXAMINATION
Physical Exam    Vital Signs: I have reviewed the initial vital signs.  Constitutional: well-appearing, appears stated age  Cardiovascular: regular rate, regular rhythm, well-perfused extremities  Musculoskeletal: supple neck, no swelling or deformity in the foot, no ttp  Integumentary: warm, dry, no rash  Neurologic: extremities’ motor and sensory functions grossly intact

## 2024-01-26 NOTE — ED ADULT NURSE NOTE - OBJECTIVE STATEMENT
34 y/o male bibems for eval of right foot pain s/p injuring it after going up steps. patient is ambulatory with steady gait able to make needs known.

## 2024-01-26 NOTE — ED PROVIDER NOTE - OBJECTIVE STATEMENT
32 yo m no sig pmhx here with acute onset of R foot pain 1st mtp after pt struck foot against stairs, able to bare weight and ambulate.    I have reviewed available current nursing and previous documentation of past medical, surgical, family, and/or social history.
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
## 2024-01-29 DIAGNOSIS — M79.671 PAIN IN RIGHT FOOT: ICD-10-CM
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
## 2024-01-29 DIAGNOSIS — S90.31XA CONTUSION OF RIGHT FOOT, INITIAL ENCOUNTER: ICD-10-CM

## 2024-03-07 ENCOUNTER — HOSPITAL ENCOUNTER (INPATIENT)
Dept: HOSPITAL 74 - YASAS | Age: 34
LOS: 6 days | Discharge: HOME | DRG: 772 | End: 2024-03-13
Attending: PSYCHIATRY & NEUROLOGY | Admitting: ALLERGY & IMMUNOLOGY
Payer: COMMERCIAL

## 2024-03-07 VITALS — BODY MASS INDEX: 29.2 KG/M2

## 2024-03-07 DIAGNOSIS — Z28.9: ICD-10-CM

## 2024-03-07 DIAGNOSIS — F19.24: ICD-10-CM

## 2024-03-07 DIAGNOSIS — Z59.00: ICD-10-CM

## 2024-03-07 DIAGNOSIS — Z56.0: ICD-10-CM

## 2024-03-07 DIAGNOSIS — Z88.8: ICD-10-CM

## 2024-03-07 DIAGNOSIS — F10.20: ICD-10-CM

## 2024-03-07 DIAGNOSIS — F11.20: Primary | ICD-10-CM

## 2024-03-07 DIAGNOSIS — F25.0: ICD-10-CM

## 2024-03-07 DIAGNOSIS — F17.210: ICD-10-CM

## 2024-03-07 DIAGNOSIS — Z28.310: ICD-10-CM

## 2024-03-07 DIAGNOSIS — F14.20: ICD-10-CM

## 2024-03-07 PROCEDURE — HZ42ZZZ GROUP COUNSELING FOR SUBSTANCE ABUSE TREATMENT, COGNITIVE-BEHAVIORAL: ICD-10-PCS | Performed by: PSYCHIATRY & NEUROLOGY

## 2024-03-07 SDOH — ECONOMIC STABILITY - INCOME SECURITY: UNEMPLOYMENT, UNSPECIFIED: Z56.0

## 2024-03-07 SDOH — ECONOMIC STABILITY - HOUSING INSECURITY: HOMELESSNESS UNSPECIFIED: Z59.00

## 2024-03-08 LAB
ALBUMIN SERPL-MCNC: 3 G/DL (ref 3.4–5)
ALP SERPL-CCNC: 103 U/L (ref 45–117)
ALT SERPL-CCNC: 35 U/L (ref 13–61)
ANION GAP SERPL CALC-SCNC: 6 MMOL/L (ref 4–13)
AST SERPL-CCNC: 24 U/L (ref 15–37)
BILIRUB SERPL-MCNC: 0.2 MG/DL (ref 0.2–1)
BUN SERPL-MCNC: 13.6 MG/DL (ref 7–18)
CALCIUM SERPL-MCNC: 8.6 MG/DL (ref 8.5–10.1)
CHLORIDE SERPL-SCNC: 106 MMOL/L (ref 98–107)
CO2 SERPL-SCNC: 27 MMOL/L (ref 21–32)
CREAT SERPL-MCNC: 0.8 MG/DL (ref 0.55–1.3)
DEPRECATED RDW RBC AUTO: 12.4 % (ref 11.9–15.9)
GLUCOSE SERPL-MCNC: 77 MG/DL (ref 74–106)
HCT VFR BLD CALC: 35.7 % (ref 35.4–49)
HGB BLD-MCNC: 12.4 GM/DL (ref 11.7–16.9)
MCH RBC QN AUTO: 31.5 PG (ref 25.7–33.7)
MCHC RBC AUTO-ENTMCNC: 34.8 G/DL (ref 32–35.9)
MCV RBC: 90.5 FL (ref 80–96)
PLATELET # BLD AUTO: 292 10^3/UL (ref 134–434)
PMV BLD: 7.3 FL (ref 7.5–11.1)
POTASSIUM SERPLBLD-SCNC: 4 MMOL/L (ref 3.5–5.1)
PROT SERPL-MCNC: 6.2 G/DL (ref 6.4–8.2)
RBC # BLD AUTO: 3.95 M/MM3 (ref 4–5.6)
SODIUM SERPL-SCNC: 140 MMOL/L (ref 136–145)
TREPONEMA PALLIDUM AB [UNITS/VOLUME] IN SERUM OR PLASMA BY IMMUNOASSAY: (no result)
WBC # BLD AUTO: 7.3 K/MM3 (ref 4–10)

## 2024-03-08 RX ADMIN — Medication SCH: at 21:47

## 2024-03-08 RX ADMIN — Medication SCH: at 01:44

## 2024-03-08 RX ADMIN — Medication SCH TAB: at 12:45

## 2024-03-08 RX ADMIN — NICOTINE SCH MG: 14 PATCH, EXTENDED RELEASE TRANSDERMAL at 12:46

## 2024-03-09 RX ADMIN — IBUPROFEN PRN MG: 600 TABLET, FILM COATED ORAL at 21:42

## 2024-03-09 RX ADMIN — TRIMETHOBENZAMIDE HYDROCHLORIDE ONE MG: 100 INJECTION INTRAMUSCULAR at 22:38

## 2024-03-11 LAB
APPEARANCE UR: CLEAR
BILIRUB UR STRIP.AUTO-MCNC: NEGATIVE MG/DL
COLOR UR: YELLOW
KETONES UR QL STRIP: NEGATIVE
LEUKOCYTE ESTERASE UR QL STRIP.AUTO: NEGATIVE
NITRITE UR QL STRIP: NEGATIVE
PH UR: 6 [PH] (ref 5–8)
PROT UR QL STRIP: NEGATIVE
PROT UR QL STRIP: NEGATIVE
SP GR UR: 1.02 (ref 1.01–1.03)
UROBILINOGEN UR STRIP-MCNC: 1 MG/DL (ref 0.2–1)

## 2024-03-12 VITALS — RESPIRATION RATE: 18 BRPM

## 2024-03-13 VITALS — TEMPERATURE: 97.9 F | DIASTOLIC BLOOD PRESSURE: 66 MMHG | HEART RATE: 60 BPM | SYSTOLIC BLOOD PRESSURE: 117 MMHG

## 2024-03-13 RX ADMIN — HYDROXYZINE PAMOATE PRN MG: 25 CAPSULE ORAL at 01:17

## 2024-04-19 ENCOUNTER — EMERGENCY (EMERGENCY)
Facility: HOSPITAL | Age: 34
LOS: 1 days | Discharge: HOME | End: 2024-04-19
Attending: STUDENT IN AN ORGANIZED HEALTH CARE EDUCATION/TRAINING PROGRAM | Admitting: STUDENT IN AN ORGANIZED HEALTH CARE EDUCATION/TRAINING PROGRAM
Payer: SELF-PAY

## 2024-04-19 VITALS
RESPIRATION RATE: 18 BRPM | DIASTOLIC BLOOD PRESSURE: 61 MMHG | SYSTOLIC BLOOD PRESSURE: 104 MMHG | OXYGEN SATURATION: 98 % | HEART RATE: 68 BPM | TEMPERATURE: 99 F

## 2024-04-19 VITALS
OXYGEN SATURATION: 98 % | TEMPERATURE: 98 F | WEIGHT: 210.1 LBS | HEIGHT: 71 IN | RESPIRATION RATE: 20 BRPM | SYSTOLIC BLOOD PRESSURE: 123 MMHG | HEART RATE: 57 BPM | DIASTOLIC BLOOD PRESSURE: 75 MMHG

## 2024-04-19 DIAGNOSIS — F43.24 ADJUSTMENT DISORDER WITH DISTURBANCE OF CONDUCT: ICD-10-CM

## 2024-04-19 DIAGNOSIS — R45.851 SUICIDAL IDEATIONS: ICD-10-CM

## 2024-04-19 DIAGNOSIS — F11.20 OPIOID DEPENDENCE, UNCOMPLICATED: ICD-10-CM

## 2024-04-19 DIAGNOSIS — Z20.822 CONTACT WITH AND (SUSPECTED) EXPOSURE TO COVID-19: ICD-10-CM

## 2024-04-19 DIAGNOSIS — F60.2 ANTISOCIAL PERSONALITY DISORDER: ICD-10-CM

## 2024-04-19 DIAGNOSIS — F19.90 OTHER PSYCHOACTIVE SUBSTANCE USE, UNSPECIFIED, UNCOMPLICATED: ICD-10-CM

## 2024-04-19 DIAGNOSIS — Z59.00 HOMELESSNESS UNSPECIFIED: ICD-10-CM

## 2024-04-19 DIAGNOSIS — F25.9 SCHIZOAFFECTIVE DISORDER, UNSPECIFIED: ICD-10-CM

## 2024-04-19 DIAGNOSIS — F19.19 OTHER PSYCHOACTIVE SUBSTANCE ABUSE WITH UNSPECIFIED PSYCHOACTIVE SUBSTANCE-INDUCED DISORDER: ICD-10-CM

## 2024-04-19 LAB
ALBUMIN SERPL ELPH-MCNC: 3.7 G/DL — SIGNIFICANT CHANGE UP (ref 3.3–5)
ALP SERPL-CCNC: 88 U/L — SIGNIFICANT CHANGE UP (ref 40–120)
ALT FLD-CCNC: 32 U/L — SIGNIFICANT CHANGE UP (ref 10–45)
ANION GAP SERPL CALC-SCNC: 8 MMOL/L — SIGNIFICANT CHANGE UP (ref 5–17)
AST SERPL-CCNC: 38 U/L — SIGNIFICANT CHANGE UP (ref 10–40)
BASOPHILS # BLD AUTO: 0.01 K/UL — SIGNIFICANT CHANGE UP (ref 0–0.2)
BASOPHILS NFR BLD AUTO: 0.2 % — SIGNIFICANT CHANGE UP (ref 0–2)
BILIRUB SERPL-MCNC: 0.3 MG/DL — SIGNIFICANT CHANGE UP (ref 0.2–1.2)
BUN SERPL-MCNC: 13 MG/DL — SIGNIFICANT CHANGE UP (ref 7–23)
CALCIUM SERPL-MCNC: 9.3 MG/DL — SIGNIFICANT CHANGE UP (ref 8.4–10.5)
CHLORIDE SERPL-SCNC: 105 MMOL/L — SIGNIFICANT CHANGE UP (ref 96–108)
CO2 SERPL-SCNC: 26 MMOL/L — SIGNIFICANT CHANGE UP (ref 22–31)
CREAT SERPL-MCNC: 0.74 MG/DL — SIGNIFICANT CHANGE UP (ref 0.5–1.3)
EGFR: 123 ML/MIN/1.73M2 — SIGNIFICANT CHANGE UP
EOSINOPHIL # BLD AUTO: 0.06 K/UL — SIGNIFICANT CHANGE UP (ref 0–0.5)
EOSINOPHIL NFR BLD AUTO: 1.3 % — SIGNIFICANT CHANGE UP (ref 0–6)
ETHANOL SERPL-MCNC: <10 MG/DL — SIGNIFICANT CHANGE UP (ref 0–10)
GLUCOSE SERPL-MCNC: 105 MG/DL — HIGH (ref 70–99)
HCT VFR BLD CALC: 34 % — LOW (ref 39–50)
HGB BLD-MCNC: 11.3 G/DL — LOW (ref 13–17)
IMM GRANULOCYTES NFR BLD AUTO: 0.2 % — SIGNIFICANT CHANGE UP (ref 0–0.9)
LYMPHOCYTES # BLD AUTO: 1.42 K/UL — SIGNIFICANT CHANGE UP (ref 1–3.3)
LYMPHOCYTES # BLD AUTO: 31.4 % — SIGNIFICANT CHANGE UP (ref 13–44)
MCHC RBC-ENTMCNC: 30.8 PG — SIGNIFICANT CHANGE UP (ref 27–34)
MCHC RBC-ENTMCNC: 33.2 GM/DL — SIGNIFICANT CHANGE UP (ref 32–36)
MCV RBC AUTO: 92.6 FL — SIGNIFICANT CHANGE UP (ref 80–100)
MONOCYTES # BLD AUTO: 0.23 K/UL — SIGNIFICANT CHANGE UP (ref 0–0.9)
MONOCYTES NFR BLD AUTO: 5.1 % — SIGNIFICANT CHANGE UP (ref 2–14)
NEUTROPHILS # BLD AUTO: 2.79 K/UL — SIGNIFICANT CHANGE UP (ref 1.8–7.4)
NEUTROPHILS NFR BLD AUTO: 61.8 % — SIGNIFICANT CHANGE UP (ref 43–77)
NRBC # BLD: 0 /100 WBCS — SIGNIFICANT CHANGE UP (ref 0–0)
PLATELET # BLD AUTO: 231 K/UL — SIGNIFICANT CHANGE UP (ref 150–400)
POTASSIUM SERPL-MCNC: 4.6 MMOL/L — SIGNIFICANT CHANGE UP (ref 3.5–5.3)
POTASSIUM SERPL-SCNC: 4.6 MMOL/L — SIGNIFICANT CHANGE UP (ref 3.5–5.3)
PROT SERPL-MCNC: 6.4 G/DL — SIGNIFICANT CHANGE UP (ref 6–8.3)
RBC # BLD: 3.67 M/UL — LOW (ref 4.2–5.8)
RBC # FLD: 12.6 % — SIGNIFICANT CHANGE UP (ref 10.3–14.5)
SARS-COV-2 RNA SPEC QL NAA+PROBE: SIGNIFICANT CHANGE UP
SODIUM SERPL-SCNC: 139 MMOL/L — SIGNIFICANT CHANGE UP (ref 135–145)
WBC # BLD: 4.52 K/UL — SIGNIFICANT CHANGE UP (ref 3.8–10.5)
WBC # FLD AUTO: 4.52 K/UL — SIGNIFICANT CHANGE UP (ref 3.8–10.5)

## 2024-04-19 PROCEDURE — 85025 COMPLETE CBC W/AUTO DIFF WBC: CPT

## 2024-04-19 PROCEDURE — 80053 COMPREHEN METABOLIC PANEL: CPT

## 2024-04-19 PROCEDURE — 82962 GLUCOSE BLOOD TEST: CPT

## 2024-04-19 PROCEDURE — 80307 DRUG TEST PRSMV CHEM ANLYZR: CPT

## 2024-04-19 PROCEDURE — 36415 COLL VENOUS BLD VENIPUNCTURE: CPT

## 2024-04-19 PROCEDURE — 99284 EMERGENCY DEPT VISIT MOD MDM: CPT

## 2024-04-19 PROCEDURE — 87635 SARS-COV-2 COVID-19 AMP PRB: CPT

## 2024-04-19 SDOH — ECONOMIC STABILITY - HOUSING INSECURITY: HOMELESSNESS UNSPECIFIED: Z59.00

## 2024-04-19 NOTE — ED ADULT NURSE NOTE - CHIEF COMPLAINT QUOTE
Pt presents to ED here for SI/HI. Pt also states " I need to do detox". Pt last alcohol was 2 days ago. FS and EKG in prog. Pt is cooperative and calm in triage. Pt A&Ox4, conversive in full sentences and ambulatory. NAD. 1:1 initiated, patient in gown, belongings collected and wanded by security. Environment checked for all ligature risks and safety hazards utilizing environmental safety checklist.

## 2024-04-19 NOTE — ED BEHAVIORAL HEALTH ASSESSMENT NOTE - PAST PSYCHOTROPIC MEDICATION
Divalproex Sodium 500 mg PO TID (12/04/21), Quetiapine Fumarate 50 mg PO qhs (12/04/21), Fluoxetine HCl 20 mg PO daily (09/03/21), Alprazolam 1 mg PO TID (07/28/21 – 08/24/21), Olanzapine 10 mg PO daily (06/21/21), Hydroxyzine Pamoate 25 mg PO QID (06/21/21), Ziprasidone Mesylate Injection 10 mg (01/08/21), Mirtazapine 15 mg PO qHs (11/07/20), Divalproex Sodium  mg PO BID (11/07/20), Alprazolam ER 2 mg PO BID (11/07/20), Lorazepam 1 mg PO BID (10/17/20), Benztropine Mesylate 1 mg PO daily (09/03/21)

## 2024-04-19 NOTE — ED BEHAVIORAL HEALTH ASSESSMENT NOTE - SUMMARY
33 year old male, single, undomiciled, unemployed with PMH Polysubstance Use Disorder (Tobacco, Alcohol, Opiates, Cannabis, Stimulant, Sedative/Hypnotic) and PPH Antisocial Personality Disorder, Bipolar Disorder vs Schizoaffective Disorder vs Schizophrenia vs SIP/SIMD, ALEXANDRA, ADHD, Adjustment Disorder, history of multiple inpatient psychiatric hospitalizations most recently at [facility] (01/26/24-01/28/24) for Schizophrenia Unspecified and [facility] (01/06/24-01/18/24) for Schizoaffective Disorder Bipolar Type, history of multiple DIAZ detox/rehabilitation most recently at at [facility] (03/16/24-03/19/24; 02/1/24-02/06/24) for Sedative hypnotic or anxiolytic dependence uncomplicated, currently in outpatient treatment at [facility] and receiving methadone for Opioid Maintenance Therapy , history of treatment non-compliance, self-reported history of multiple suicide attempts, no history of NSSIB/Violence, no history of trauma, history of incarceration, BIB self reporting CAH to kill himself via jumping on the train tracks.    On evaluation, the patient is lethargic and awakens to tactile stimulus, calm, superficially cooperative with poor eye contact, constricted affect and demonstrating good behavioral control and linear thought processes.  Although the patient reports current CAH instructing him to kill himself, there is no evidence of internal preoccupation.  The patient is goal-directed and focused on making his needs known and met as evidenced by his request for a meal and a place to lie down and sleep.  The patient is vague, inconsistent and contradicts himself relative to previous reports.  The patient expresses that he is primarily concerned with having a place to sleep for the night and uses statements about SI as a means to that end. On MSE, the patient?was found to lack any genuine distress or objectively observable findings of decompensated depressive, manic, psychotic, anxiety/panic syndrome.  There is currently no evidence of psychiatric decompensation and no evidence to support a treatable psychiatric condition beyond polysubstance use disorder.  Although the patient has numerous chronic risk factor for harm to self or others including a reported history of multiple suicide attempts and impulsivity, male gender, undomiciled, active substance use, history of treatment non-compliance, poor social support, antisocial traits, as well as poor coping mechanisms and insight, no acute risk factors are identified at this time and protective factors that mitigate this risk abound including a capacity to advocate for self, patient is organized, help-seeking, future-oriented, and currently engaged in outpatient psychiatric treatment.  The patient's chronic risk factors are unlikely to be modified by an inpatient psychiatric admission.  The patient is not at an increased risk from baseline at this time.  Given that the patient does not represent an imminent danger to self/others, he does not meet criteria for a psychiatric hospitalization or further observation. There is no clear evidence that there would be significant benefit or modification of risk factors from treatment in an inpatient setting.  The patient is psychiatrically cleared for discharge.  Any impulsive and/or violent actions taken after discharge would be volitional be better characterized as provocative acting out, goal-directed &?criminal in nature as opposed to the result of acute psychiatric illness. Overall patient's presentation at this encounter is best characterized as Adjustment Disorder with disturbance of conduct.  The patient was offered an opportunity to complete a safety plan prior to discharge but declined.  The patient would benefit from following up with his outpatient providers at : [facility] on Monday, 04/22/24 at 8:00 am located at [address] [place] [address] ([phone]).       Plan:  - The patient is psychiatrically cleared for discharge to self  - ELMER/VRA chronically elevated but not acutely.  The patient was provided with the opportunity to complete a Safety Plan prior to discharge but declined.  - The patient was provided with outpatient psychiatric follow-up at [facility] to present on 04/22/24 at 8:00 am.  The patient was also provided with a referral to   - The patient was also provided with crisis support and referral information including [address] [place] [address] and instructed to call 911 or return to the nearest emergency room if the symptoms worsen.  - The patient does not demonstrate any high-risk psychosocial factors and collateral has been obtained to support the lack of these high-risk psychosocial factors.  The patient has adequate access to food, medication and safe shelter and is cleared for discharge with outpatient follow-up  - The patient was provided with a shelter referral prior to discharge. 33 year old male, single, undomiciled, unemployed with PMH Polysubstance Use Disorder (Tobacco, Alcohol, Opiates, Cannabis, Stimulant, Sedative/Hypnotic) and PPH Antisocial Personality Disorder, Bipolar Disorder vs Schizoaffective Disorder vs Schizophrenia vs SIP/SIMD, ALEXANDRA, ADHD, Adjustment Disorder, history of multiple inpatient psychiatric hospitalizations most recently at [facility] (01/26/24-01/28/24) for Schizophrenia Unspecified and [facility] (01/06/24-01/18/24) for Schizoaffective Disorder Bipolar Type, history of multiple DIAZ detox/rehabilitation most recently at at [facility] (03/16/24-03/19/24; 02/1/24-02/06/24) for Sedative hypnotic or anxiolytic dependence uncomplicated, currently in outpatient treatment at [facility] and receiving methadone for Opioid Maintenance Therapy , history of treatment non-compliance, self-reported history of multiple suicide attempts, no history of NSSIB/Violence, no history of trauma, history of incarceration, BIB self reporting CAH to kill himself via jumping on the train tracks.    On evaluation, the patient is lethargic and awakens to tactile stimulus, calm, superficially cooperative with poor eye contact, constricted affect and demonstrating good behavioral control and linear thought processes.  Although the patient reports current CAH instructing him to kill himself, there is no evidence of internal preoccupation.  The patient is goal-directed and focused on making his needs known and met as evidenced by his request for a meal and a place to lie down and sleep.  The patient is vague, inconsistent and contradicts himself relative to previous reports.  The patient expresses that he is primarily concerned with having a place to sleep for the night and uses statements about SI as a means to that end. On MSE, the patient?was found to lack any genuine distress or objectively observable findings of decompensated depressive, manic, psychotic, anxiety/panic syndrome.  There is currently no evidence of psychiatric decompensation and no evidence to support a treatable psychiatric condition beyond polysubstance use disorder.  Although the patient has numerous chronic risk factor for harm to self or others including a reported history of multiple suicide attempts and impulsivity, male gender, undomiciled, active substance use, history of treatment non-compliance, poor social support, antisocial traits, as well as poor coping mechanisms and insight, no acute risk factors are identified at this time and protective factors that mitigate this risk abound including a capacity to advocate for self, patient is organized, help-seeking, future-oriented, and currently engaged in outpatient psychiatric treatment.  The patient's chronic risk factors are unlikely to be modified by an inpatient psychiatric admission.  The patient is not at an increased risk from baseline at this time.  Given that the patient does not represent an imminent danger to self/others, he does not meet criteria for a psychiatric hospitalization or further observation. There is no clear evidence that there would be significant benefit or modification of risk factors from treatment in an inpatient setting.  The patient is psychiatrically cleared for discharge.  Any impulsive and/or violent actions taken after discharge would be volitional be better characterized as provocative acting out, goal-directed &?criminal in nature as opposed to the result of acute psychiatric illness. Overall patient's presentation at this encounter is best characterized as Adjustment Disorder with disturbance of conduct.  The patient was offered an opportunity to complete a safety plan prior to discharge but declined.  The patient would benefit from following up with his outpatient providers at : [facility] on Monday, 04/22/24 at 8:00 am located at [address] [place] [phone] ([phone]).       Plan:  - The patient is psychiatrically cleared for discharge to [facility]  - ELMER/VRA chronically elevated but not acutely.  The patient was provided with the opportunity to complete a Safety Plan prior to discharge but declined.  - The patient was provided with outpatient psychiatric follow-up at [facility] to present on 04/22/24 at 8:00 am.  The patient was also provided with a referral to [facility], Monday – Friday: 7:30am – 1pm located at [address] [place] [address] ([phone])  - The patient was also provided with crisis support and referral information including [address] [place] [address] and instructed to call 911 or return to the nearest emergency room if the symptoms worsen.  - The patient was offered a shelter referral prior to discharge but he declined.

## 2024-04-19 NOTE — ED PROVIDER NOTE - PATIENT PORTAL LINK FT
Reason for Call: Request for an order or referral:    Order or referral being requested: Medication and homecare orders    Date needed: as soon as possible    Has the patient been seen by the PCP for this problem? YES    Additional comments: Dulce flanagan has sent in orders for patient.     Phone number Patient can be reached at:  Other phone number:  [phone]    Best Time:  anytime    Can we leave a detailed message on this number?  YES    Call taken on 7/7/2017 at 11:00 AM by Hector Pham     You can access the FollowMyHealth Patient Portal offered by [facility] by registering at the following website: [email or web address]. By joining [facility]’s FollowMyHealth portal, you will also be able to view your health information using other applications (apps) compatible with our system.

## 2024-04-19 NOTE — ED BEHAVIORAL HEALTH ASSESSMENT NOTE - OTHER PAST PSYCHIATRIC HISTORY (INCLUDE DETAILS REGARDING ONSET, COURSE OF ILLNESS, INPATIENT/OUTPATIENT TREATMENT)
[place]:  MEDICAID ID: [id number]  CARE COORDINATION: [facility] Most Recent Placement Date 01/04/2024; Exit Date: 01/07/2024 Exit Reason Unknown  [place] FOR SI: SI x22 (first on 09/30/20), most recently on 01/06/2024 at [facility] ER, Opioid Overdose x3 (first on 05/04/18) most recently on 05/07/21 at [facility]  QUALITY FLAGS: High Mental Health Need, Mental Health Placement Consideration, [facility] Plus Eligibility, Medicaid Eligibility Alert; No Outpatient Medical Visit > 1Yr; Clozapine Candidate with 4+ Inpatient/ER -  (adult)   BEHAVIORAL HEALTH DIAGNOSES: Tobacco related disorder Opioid related disorders Unspecified/Other Anxiety Disorder Other psychoactive substance related disorders Unspecified/Other Bipolar Schizophrenia Antisocial Personality Disorder Major Depressive Disorder Other stimulant related disorders Alcohol related disorders Schizoaffective Disorder Cannabis related disorders Sedative, hypnotic, or anxiolytic related disorders Substance-Induced Depressive Disorder Attention Deficit Hyperactivity Disorder Cocaine related disorders Unspecified/Other Psychotic Disorders Bipolar I Unspecified/Other Depressive Disorder Generalized Anxiety Disorder Adjustment Disorder  BEHAVIORAL HEALTH MEDICATIONS: Divalproex Sodium 500 mg PO TID (12/04/21), Quetiapine Fumarate 50 mg PO qhs (12/04/21), Fluoxetine HCl 20 mg PO daily (09/03/21), Alprazolam 1 mg PO TID (07/28/21 – 08/24/21), Olanzapine 10 mg PO daily (06/21/21), Hydroxyzine Pamoate 25 mg PO QID (06/21/21), Ziprasidone Mesylate Injection 10 mg (01/08/21), Mirtazapine 15 mg PO qHs (11/07/20), Divalproex Sodium  mg PO BID (11/07/20), Alprazolam ER 2 mg PO BID (11/07/20), Lorazepam 1 mg PO BID (10/17/20), Benztropine Mesylate 1 mg PO daily (09/03/21)  OUTPATIENT: [facility] located at [address] [place] [address] [phone] (01/24/24 – 03/14/24) for Opioid Dependence Uncomplicated, [facility] (08/03/21 – 09/03/21) for Other psychoactive substance use unspecified with psychoactive SIMD, [facility] (12/30/16) for MDD single episode unspecified   INPATIENT: DIAZ x2 most recently at [facility] (03/16/24-03/19/24; 02/1/24-02/06/24) for Sedative hypnotic or anxiolytic dependence uncomplicated, MH x4 most recently at [facility] (01/26/24-01/28/24) for Schizophrenia Unspecified, [facility] (01/06/24-01/18/24) for Schizoaffective Disorder Bipolar Type, [facility] (11/22/21 – 11/25/21) for Schizoaffective Disorder Bipolar Type, [facility] (11/01/20 – 11/07/20) for Unspecified psychosis not due to a substance or known physiological condition  ED: MH x31 most recently at [facility] (01/06/24) for SI, [facility] (10/27/23), [facility] (04/10/22) for General Psychiatric Examination Requested By Authority, [facility] (04/02/23 – 04/03/22) for SI; DIAZ x8 most recently at [facility] (11/13/23) for Other psychoactive substance use, unspecified with unspecified psychoactive substance-induced disorder, [facility] (11/26/21-11/27/21) for Other psychoactive substance abuse uncomplicated

## 2024-04-19 NOTE — ED BEHAVIORAL HEALTH ASSESSMENT NOTE - SAFETY PLAN ADDT'L DETAILS
Education provided regarding environmental safety / lethal means restriction/Provision of National Suicide Prevention Lifeline 3-009-917-TALK (1057)

## 2024-04-19 NOTE — ED PROVIDER NOTE - CLINICAL SUMMARY MEDICAL DECISION MAKING FREE TEXT BOX
32 yo male with h/o schizoaffective d/o, opioids and alcohol addiction, on Methadone, present to Er c/o suicidal thoughts for the past 3 days, pt reports he is not on any medications currently and has not seen any psychiatrist for few month. pt is undomicile.  Pt is non-toxic appearing, calm and cooperative. Labs done. pending psychiatric evaluation.
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
## 2024-04-19 NOTE — ED ADULT TRIAGE NOTE - CHIEF COMPLAINT QUOTE
Pt presents to ED here for SI/HI. Pt also states " I need to do detox". Pt last alcohol was 2 days ago. FS and EKG in prog. Pt is cooperative and calm in triage. Pt A&Ox4, conversive in full sentences and ambulatory. NAD. Pt presents to ED here for SI/HI. Pt also states " I need to do detox". Pt last alcohol was 2 days ago. FS and EKG in prog. Pt is cooperative and calm in triage. Pt A&Ox4, conversive in full sentences and ambulatory. NAD. 1:1 initiated, patient in gown, belongings collected and wanded by security. Environment checked for all ligature risks and safety hazards utilizing environmental safety checklist.

## 2024-04-19 NOTE — ED BEHAVIORAL HEALTH ASSESSMENT NOTE - DESCRIPTION
Per ED Provider Note written by VLADIMIR Delcid on 04/19/24:  "· Chief Complaint: The patient is a 33y Male complaining of psychiatric evaluation.  · HPI Objective Statement: 34 yo male with h/o schizoaffective d/o, opioids and alcohol addiction, on Methadone, present to Er c/o suicidal thoughts for the past 3 days, pt reports he is not on any medications currently and has not seen any psychiatrist for few month. pt is undomiciled.  Pt is non-toxic appearing, calm and cooperative. Labs done.    Vital Signs Last 24 Hrs  T(C): 37 (19 Apr 2024 22:15), Max: 37 (19 Apr 2024 22:15)  T(F): 98.6 (19 Apr 2024 22:15), Max: 98.6 (19 Apr 2024 22:15)  HR: 68 (19 Apr 2024 22:15) (57 - 68)  BP: 104/61 (19 Apr 2024 22:15) (104/61 - 123/75)  BP(mean): --  RR: 18 (19 Apr 2024 22:15) (18 - 20)  SpO2: 98% (19 Apr 2024 22:15) (98% - 98%)    CBC:            11.3   4.52  )-----------( 231      ( 04-19-24 @ 20:56 )             34.0     Chem:         ( 04-19-24 @ 20:56 )  139  |  105  |  13  ----------------------------<  105<H>  4.6   |  26  |  0.74    Liver Functions: ( 04-19-24 @ 20:56): Alb: 3.7 g/dL / Pro: 6.4 g/dL / ALK PHOS: 88 U/L / ALT: 32 U/L / AST: 38 U/L / GGT: x Bilirubin: (04-19-24 @ 20:56)  Direct: x  / Indirect: x  / Total: 0.3 The patient was born and raised in [place].  He reports that he is undomiciled and has been living on the streets for the past six months.  He reports being unemployed.  He states that he "finished some school." No significant past medical history

## 2024-04-19 NOTE — ED BEHAVIORAL HEALTH ASSESSMENT NOTE - DETAILS
Patient reports CAH to kill himself by "getting run over by a train." See HPI. Patient was offered an opportunity to complete a safety plan prior to discharge but declined. Disposition discussed with Dr. Sarah Gonzalez.

## 2024-04-19 NOTE — ED ADULT NURSE NOTE - NSHOSCREENINGQ1_ED_ALL_ED
5/25/2021     No

## 2024-04-19 NOTE — ED ADULT NURSE REASSESSMENT NOTE - NS ED NURSE REASSESS COMMENT FT1
Received pt from day shift RN Reyes. 1:1 initiated, patient in gown, belongings collected and wanded by security. Environment checked for all ligature risks and safety hazards utilizing environmental safety checklist. A&Ox4, RA, NAD, ambulatory. Denies pain/discomfort at this time. Speaking in clear coherent sentences, respirations are spontaneous and unlabored. No obvious signs of injury. Received pt from day shift RN Reyes. 1:1 continued, patient in gown, belongings collected and wanded by security. Environment checked for all ligature risks and safety hazards utilizing environmental safety checklist. A&Ox4, RA, NAD, ambulatory. Denies pain/discomfort at this time. Speaking in clear coherent sentences, respirations are spontaneous and unlabored. No obvious signs of injury.

## 2024-04-19 NOTE — ED PROVIDER NOTE - OBJECTIVE STATEMENT
32 yo male with h/o schizoaffective d/o, opioids and alcohol addiction, on Methadone, present to Er c/o suicidal thoughts for the past 3 days, pt reports he is not on any medications currently and has not seen any psychiatrist for few month. pt is undomicile.

## 2024-04-19 NOTE — ED BEHAVIORAL HEALTH ASSESSMENT NOTE - REFERRAL / APPOINTMENT DETAILS
The patient was provided with outpatient psychiatric follow-up at [facility] to present on 04/22/24 at 8:00 am.  The patient was also provided with a referral to [facility] for Walk in services, Monday – Friday: 7:30am – 1pm located at [address]) [place] [id number] ([phone])

## 2024-04-19 NOTE — ED BEHAVIORAL HEALTH ASSESSMENT NOTE - SECONDARY DX1
-Nutrition: Stressed importance of moderation in sodium/caffeine intake, saturated fat and cholesterol, caloric balance, sufficient intake of fresh fruits, vegetables, fiber, ---Exercise: Stressed the importance of regular exercise.   --Injury prevention: Discussed safety belts, safety helmets, smoke detector, smoking near bedding or upholstery.   --Dental health: Discussed importance of regular tooth brushing, flossing, and dental visits.  --Immunizations reviewed.  --Discussed benefits of screening colonoscopy.  --After hours service discussed with patient    Polysubstance use disorder

## 2024-04-19 NOTE — ED BEHAVIORAL HEALTH ASSESSMENT NOTE - NSBHSAOPI_PSY_A_CORE FT
History of Opioid Use Disorder.  Currently prescribed methadone 170 mg PO daily for Opioid Maintenance Treatment.

## 2024-04-19 NOTE — ED BEHAVIORAL HEALTH ASSESSMENT NOTE - DIFFERENTIAL
Adjustment Disorder with disturbance of conduct  Polysubstance Use Disorder  Antisocial Personality Disorder  SIP vs SIMD

## 2024-04-19 NOTE — ED BEHAVIORAL HEALTH ASSESSMENT NOTE - RISK ASSESSMENT
Although the patient has numerous chronic risk factor for harm to self or others including a reported history of multiple suicide attempts and impulsivity, male gender, undomiciled, active substance use, history of treatment non-compliance, poor social support, antisocial traits, as well as poor coping mechanisms and insight, no acute risk factors are identified at this time and protective factors that mitigate this risk abound including a capacity to advocate for self, patient is organized, help-seeking, future-oriented, and currently engaged in outpatient psychiatric treatment.  The patient's chronic risk factors are unlikely to be modified by an inpatient psychiatric admission.  The patient is not at an increased risk from baseline at this time.  Given that the patient does not represent an imminent danger to self/others, he does not meet criteria for a psychiatric hospitalization or further observation. There is no clear evidence that there would be significant benefit or modification of risk factors from treatment in an inpatient setting.  The patient is psychiatrically cleared for discharge.

## 2024-04-19 NOTE — ED PROVIDER NOTE - NSFOLLOWUPINSTRUCTIONS_ED_ALL_ED_FT
Please follow up with psychiatrist at [facility] as recommended.     Substance Use Disorder  Substance use disorder occurs when a person's repeated use of drugs or alcohol interferes with the ability to be productive. This disorder can cause problems with mental and physical health. It can affect your ability to have healthy relationships, and it can keep you from being able to meet your responsibilities at work, home, or school. It can also lead to addiction, which is a condition in which you cannot stop using the substance consistently for a period of time.    Addiction changes the way the brain works. Because of these changes, addiction is a chronic condition. Substance use disorder can be mild, moderate, or severe.    Some commonly misused substances that can lead to this disorder include:  Alcohol.  Tobacco.  Marijuana.  Stimulants, such as cocaine and methamphetamine.  Hallucinogens, such as LSD and PCP.  Opioids, such as some prescription pain medicines and heroin.  What are the causes?  This condition may develop due to many complex social, psychological, or physical reasons, such as:  Stress.  Abuse.  Peer pressure.  Anxiety or depression.  What increases the risk?  This condition is more likely to develop in people who:  Use substances to cope with stress.  Have been abused.  Have a mental health disorder, such as depression.  Have a family history of substance use disorder.  What are the signs or symptoms?  Symptoms of this condition include:  Using the substance for longer periods of time or at a higher dosage than what is normal or intended.  Having a lasting desire to use the substance.  Being unable to slow down or stop the use of the substance.  Spending an abnormal amount of time getting the substance, using the substance, or recovering from using the substance.  Using the substance in a way that interferes with work, school, social activities, and personal relationships.  Using the substance even after having negative consequences, such as:  Health problems.  Legal or financial troubles.  Job loss.  Relationship problems.  Needing more and more of the substance to get the same effect (developing tolerance).  Experiencing unpleasant symptoms if you do not use the substance (withdrawal).  Using the substance to avoid withdrawal symptoms.  How is this diagnosed?  This condition may be diagnosed based on:  A physical exam.  Your history of substance use.  Your symptoms. This includes:  How substance use affects your life.  Changes in personality, behaviors, and mood.  Having at least two symptoms of substance use disorder within a 12-month period.  Health issues related to substance use, such as liver damage, shortness of breath, fatigue, cough, or heart problems.  Blood or urine tests to screen for alcohol and drugs.  How is this treated?  Three people participating in a support group.  This condition may be treated by:  Stopping substance use safely. This may require taking medicines and being closely monitored for several days.  Taking part in group and individual counseling from mental health providers who help people with substance use disorder.  Staying at a live-in (residential) treatment center for several days or weeks.  Attending daily counseling sessions at a treatment center.  Taking medicine as told by your health care provider:  To ease symptoms and prevent complications during withdrawal.  To treat other mental health issues, such as depression or anxiety.  To block cravings by causing the same effects as the substance.  To block the effects of the substance or replace good sensations with unpleasant ones.  Participating in a support group to share your experience with others who are going through the same thing. These groups are an important part of long-term recovery for many people.  Recovery can be a long process. Many people who undergo treatment start using the substance again after stopping (relapse). If you relapse, that does not mean that treatment will not work.    Follow these instructions at home:  A bottle of beer, a glass of wine, and a glass of hard liquor with a "do not drink" sign over them.   Take over-the-counter and prescription medicines only as told by your health care provider.  Do not use any drugs or alcohol.  Avoid temptations or triggers that you associate with your use of the substance.  Learn and practice techniques for managing stress.  Have a plan for vulnerable moments. Get phone numbers of people who are willing to help and who are committed to your recovery.  Attend support groups on a regular basis. These groups include 12-step programs like Alcoholics Anonymous and Narcotics Anonymous.  Keep all follow-up visits. This is important. This includes continuing to work with therapists and support groups.  Where to find more information  Substance Abuse and Mental Health Services Administration (SAMHSA): www.samhsa.gov  National [place] on Mental Illness (SAMPSON): www.sampson.org  Contact a health care provider if:  You cannot take your medicines as told.  Your symptoms get worse.  You have trouble resisting the urge to use drugs or alcohol.  Get help right away if:  You relapse.  You think that you may have taken too much of a drug. The National Poison Control Center hotline is 1-395.936.6793.  You have signs of an overdose. Symptoms include:  Chest pain.  Confusion.  Sleepiness or difficulty staying awake.  Slowed breathing.  Nausea or vomiting.  A seizure.  You have serious thoughts about hurting yourself or someone else.  Drug overdose is an emergency. Do not wait to see if the symptoms will go away. Get medical help right away. Call your local emergency services (164 in the [place] Do not drive yourself to the hospital.    If you ever feel like you may hurt yourself or others, or have thoughts about taking your own life, get help right away. Go to your nearest emergency department or:  Call your local emergency services (865 in the [place]  Call a suicide crisis helpline, such as the National Suicide Prevention Lifeline at 1-255.880.6955 or 753 in the [place] This is open 24 hours a day in the [place]  Text the Crisis Text Line at 811205 (in the [place]  Summary  Substance use disorder occurs when a person's repeated use of drugs or alcohol interferes with the ability to be productive.  Taking part in group and individual counseling from mental health providers is a common treatment for people with substance use disorder.  Recovery can be a long process. Many people who undergo treatment start using the substance again after stopping (relapse). A relapse does not mean that treatment will not work.  Attend support groups such as Alcoholics Anonymous and Narcotics Anonymous. These groups are an important part of long-term recovery for many people.  This information is not intended to replace advice given to you by your health care provider. Make sure you discuss any questions you have with your health care provider.
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
## 2024-04-27 ENCOUNTER — EMERGENCY (EMERGENCY)
Facility: HOSPITAL | Age: 34
LOS: 1 days | Discharge: HOME | End: 2024-04-27
Attending: EMERGENCY MEDICINE | Admitting: EMERGENCY MEDICINE
Payer: MEDICAID

## 2024-04-27 VITALS
HEART RATE: 99 BPM | DIASTOLIC BLOOD PRESSURE: 82 MMHG | HEIGHT: 71 IN | TEMPERATURE: 98 F | OXYGEN SATURATION: 97 % | RESPIRATION RATE: 18 BRPM | SYSTOLIC BLOOD PRESSURE: 127 MMHG

## 2024-04-27 LAB
ADD ON TEST-SPECIMEN IN LAB: SIGNIFICANT CHANGE UP
ANION GAP SERPL CALC-SCNC: 11 MMOL/L — SIGNIFICANT CHANGE UP (ref 5–17)
BASOPHILS # BLD AUTO: 0.01 K/UL — SIGNIFICANT CHANGE UP (ref 0–0.2)
BASOPHILS NFR BLD AUTO: 0.3 % — SIGNIFICANT CHANGE UP (ref 0–2)
BUN SERPL-MCNC: 14 MG/DL — SIGNIFICANT CHANGE UP (ref 7–23)
CALCIUM SERPL-MCNC: 8.9 MG/DL — SIGNIFICANT CHANGE UP (ref 8.4–10.5)
CHLORIDE SERPL-SCNC: 103 MMOL/L — SIGNIFICANT CHANGE UP (ref 96–108)
CO2 SERPL-SCNC: 25 MMOL/L — SIGNIFICANT CHANGE UP (ref 22–31)
CREAT SERPL-MCNC: 0.68 MG/DL — SIGNIFICANT CHANGE UP (ref 0.5–1.3)
EGFR: 127 ML/MIN/1.73M2 — SIGNIFICANT CHANGE UP
EOSINOPHIL # BLD AUTO: 0.03 K/UL — SIGNIFICANT CHANGE UP (ref 0–0.5)
EOSINOPHIL NFR BLD AUTO: 0.8 % — SIGNIFICANT CHANGE UP (ref 0–6)
GLUCOSE SERPL-MCNC: 93 MG/DL — SIGNIFICANT CHANGE UP (ref 70–99)
HCT VFR BLD CALC: 36.8 % — LOW (ref 39–50)
HGB BLD-MCNC: 11.9 G/DL — LOW (ref 13–17)
IMM GRANULOCYTES NFR BLD AUTO: 0.3 % — SIGNIFICANT CHANGE UP (ref 0–0.9)
LIDOCAIN IGE QN: 11 U/L — SIGNIFICANT CHANGE UP (ref 7–60)
LYMPHOCYTES # BLD AUTO: 1.54 K/UL — SIGNIFICANT CHANGE UP (ref 1–3.3)
LYMPHOCYTES # BLD AUTO: 39.3 % — SIGNIFICANT CHANGE UP (ref 13–44)
MCHC RBC-ENTMCNC: 29.8 PG — SIGNIFICANT CHANGE UP (ref 27–34)
MCHC RBC-ENTMCNC: 32.3 GM/DL — SIGNIFICANT CHANGE UP (ref 32–36)
MCV RBC AUTO: 92.2 FL — SIGNIFICANT CHANGE UP (ref 80–100)
MONOCYTES # BLD AUTO: 0.25 K/UL — SIGNIFICANT CHANGE UP (ref 0–0.9)
MONOCYTES NFR BLD AUTO: 6.4 % — SIGNIFICANT CHANGE UP (ref 2–14)
NEUTROPHILS # BLD AUTO: 2.08 K/UL — SIGNIFICANT CHANGE UP (ref 1.8–7.4)
NEUTROPHILS NFR BLD AUTO: 52.9 % — SIGNIFICANT CHANGE UP (ref 43–77)
NRBC # BLD: 0 /100 WBCS — SIGNIFICANT CHANGE UP (ref 0–0)
PLATELET # BLD AUTO: 205 K/UL — SIGNIFICANT CHANGE UP (ref 150–400)
POTASSIUM SERPL-MCNC: 3.6 MMOL/L — SIGNIFICANT CHANGE UP (ref 3.5–5.3)
POTASSIUM SERPL-SCNC: 3.6 MMOL/L — SIGNIFICANT CHANGE UP (ref 3.5–5.3)
RBC # BLD: 3.99 M/UL — LOW (ref 4.2–5.8)
RBC # FLD: 12.5 % — SIGNIFICANT CHANGE UP (ref 10.3–14.5)
SODIUM SERPL-SCNC: 139 MMOL/L — SIGNIFICANT CHANGE UP (ref 135–145)
WBC # BLD: 3.92 K/UL — SIGNIFICANT CHANGE UP (ref 3.8–10.5)
WBC # FLD AUTO: 3.92 K/UL — SIGNIFICANT CHANGE UP (ref 3.8–10.5)

## 2024-04-27 PROCEDURE — 85025 COMPLETE CBC W/AUTO DIFF WBC: CPT

## 2024-04-27 PROCEDURE — 83690 ASSAY OF LIPASE: CPT

## 2024-04-27 PROCEDURE — 99284 EMERGENCY DEPT VISIT MOD MDM: CPT

## 2024-04-27 PROCEDURE — 80048 BASIC METABOLIC PNL TOTAL CA: CPT

## 2024-04-27 PROCEDURE — 99283 EMERGENCY DEPT VISIT LOW MDM: CPT

## 2024-04-27 PROCEDURE — 36415 COLL VENOUS BLD VENIPUNCTURE: CPT

## 2024-04-27 PROCEDURE — 80076 HEPATIC FUNCTION PANEL: CPT

## 2024-04-27 RX ORDER — FAMOTIDINE 10 MG/ML
20 INJECTION INTRAVENOUS DAILY
Refills: 0 | Status: DISCONTINUED | OUTPATIENT
Start: 2024-04-27 | End: 2024-04-30

## 2024-04-27 RX ORDER — ONDANSETRON 8 MG/1
4 TABLET, FILM COATED ORAL ONCE
Refills: 0 | Status: COMPLETED | OUTPATIENT
Start: 2024-04-27 | End: 2024-04-27

## 2024-04-27 RX ADMIN — FAMOTIDINE 20 MILLIGRAM(S): 10 INJECTION INTRAVENOUS at 05:54

## 2024-04-27 RX ADMIN — ONDANSETRON 4 MILLIGRAM(S): 8 TABLET, FILM COATED ORAL at 05:54

## 2024-04-27 NOTE — ED ADULT TRIAGE NOTE - ARRIVAL INFO ADDITIONAL COMMENTS
pt c/o sudden onset of crotch pain and burning with n[place]v [place] [address] [place]  admits to crack and heroin use 4 hours ago

## 2024-04-27 NOTE — ED PROVIDER NOTE - PATIENT PORTAL LINK FT
You can access the FollowMyHealth Patient Portal offered by [facility] by registering at the following website: [email or web address]. By joining [employer]’s FollowMyHealth portal, you will also be able to view your health information using other applications (apps) compatible with our system.

## 2024-04-27 NOTE — ED ADULT NURSE NOTE - OBJECTIVE STATEMENT
Pt states "my crotch hurts". Pt also endorses heroin, crack, and cocaine use tonight ~5 hrs ago. Pt c/o abdominal pain and reports one episode of nonbloody emesis PTA.

## 2024-04-27 NOTE — ED PROVIDER NOTE - OBJECTIVE STATEMENT
32 yo male with h/o schizoaffective d/o, opioids and alcohol addiction, on Methadone, in the Er c/o diffuse abdominal pain, nausea, vomiting. since yesterday. pt denies fever, chills, diarrhea or constipation.

## 2024-04-27 NOTE — ED PROVIDER NOTE - NSFOLLOWUPINSTRUCTIONS_ED_ALL_ED_FT
Abdominal Pain, Adult  A doctor talking to a person during a medical exam.  Many things can cause belly (abdominal) pain. In most cases, belly pain is not a serious problem and can be watched and treated at home. But in some cases, it can be serious.    Your doctor will try to find the cause of your belly pain.    Follow these instructions at home:  Medicines    Take over-the-counter and prescription medicines only as told by your doctor.  Do not take medicines that help you poop (laxatives) unless told by your doctor.  General instructions    Watch your belly pain for any changes. Tell your doctor if the pain gets worse.  Drink enough fluid to keep your pee (urine) pale yellow.  Contact a doctor if:  Your belly pain changes or gets worse.  You have very bad cramping or bloating in your belly.  You vomit.  Your pain gets worse with meals, after eating, or with certain foods.  You have trouble pooping or have watery poop for more than 2–3 days.  You are not hungry, or you lose weight without trying.  You have signs of not getting enough fluid or water (dehydration). These may include:  Dark pee, very little pee, or no pee.  Cracked lips or dry mouth.  Feeling sleepy or weak.  You have pain when you pee or poop.  Your belly pain wakes you up at night.  You have blood in your pee.  You have a fever.  Get help right away if:  You cannot stop vomiting.  Your pain is only in one part of your belly, like on the right side.  You have bloody or black poop, or poop that looks like tar.  You have trouble breathing.  You have chest pain.  These symptoms may be an emergency. Get help right away. Call 911.  Do not wait to see if the symptoms will go away.  Do not drive yourself to the hospital.  This information is not intended to replace advice given to you by your health care provider. Make sure you discuss any questions you have with your health care provider.

## 2024-04-27 NOTE — ED PROVIDER NOTE - CLINICAL SUMMARY MEDICAL DECISION MAKING FREE TEXT BOX
34 yo male with h/o schizoaffective d/o, opioids and alcohol addiction, on Methadone, in the Er c/o diffuse abdominal pain, nausea, vomiting. since yesterday. pt denies fever, chills, diarrhea or constipation.  pt is well appearing, has clear lungs, has soft abdomen, with mild tenderness., no g/r, no CVAT b/l.  will check labs and give zofran and pepcid . PO challenge. anticipate d/c if improves.

## 2024-04-27 NOTE — ED PROVIDER NOTE - CROS ED ROS STATEMENT
"Chief Complaint   Patient presents with     Sleep Problem     Consult, \"Snoring, restless sleep.\"       Initial /80  Pulse 63  Resp 18  Ht 1.829 m (6')  Wt 105.3 kg (232 lb 3.2 oz)  SpO2 98%  BMI 31.49 kg/m2 Estimated body mass index is 31.49 kg/(m^2) as calculated from the following:    Height as of this encounter: 1.829 m (6').    Weight as of this encounter: 105.3 kg (232 lb 3.2 oz).  Medication Reconciliation: complete     ESS 9  Neck 43cm  Consuelo Skinner    " all other ROS negative except as per HPI

## 2024-04-27 NOTE — ED PROVIDER NOTE - ATTENDING APP SHARED VISIT CONTRIBUTION OF CARE
33M hx schizoaffective, polysubstance abuse, c/o abd pain, n/v. no fevers. no diarrhea. admits to heroin and crack use tonight.   gen- nad  cv -rrr  lungs -ctab  abd - soft, no guarding  neuro -aox3, steady gait, morales  abd pain, given zofran, pepcid, labs sent. pt asking for food

## 2024-04-27 NOTE — ED PROVIDER NOTE - PROGRESS NOTE DETAILS
Pt also c/o sprained ankle that he sprained tonight coming out of bathroom  pt also asking for food  as soon as he got to his stretcher he is falling asleep. normal labs, tolerate PO well and has no vomiting, soft, benign abdomen. stable for discharge

## 2024-04-29 ENCOUNTER — HOSPITAL ENCOUNTER (INPATIENT)
Dept: HOSPITAL 74 - YASAS | Age: 34
LOS: 10 days | Discharge: LEFT BEFORE DISCHARGE | DRG: 772 | End: 2024-05-09
Attending: PSYCHIATRY & NEUROLOGY | Admitting: ALLERGY & IMMUNOLOGY
Payer: COMMERCIAL

## 2024-04-29 VITALS — BODY MASS INDEX: 29.9 KG/M2

## 2024-04-29 DIAGNOSIS — F25.0: ICD-10-CM

## 2024-04-29 DIAGNOSIS — F10.20: ICD-10-CM

## 2024-04-29 DIAGNOSIS — F25.9 SCHIZOAFFECTIVE DISORDER, UNSPECIFIED: ICD-10-CM

## 2024-04-29 DIAGNOSIS — R10.9 UNSPECIFIED ABDOMINAL PAIN: ICD-10-CM

## 2024-04-29 DIAGNOSIS — H60.01: ICD-10-CM

## 2024-04-29 DIAGNOSIS — F19.282: ICD-10-CM

## 2024-04-29 DIAGNOSIS — E78.00: ICD-10-CM

## 2024-04-29 DIAGNOSIS — Z56.0: ICD-10-CM

## 2024-04-29 DIAGNOSIS — Z28.9: ICD-10-CM

## 2024-04-29 DIAGNOSIS — Z88.8: ICD-10-CM

## 2024-04-29 DIAGNOSIS — F11.20: Primary | ICD-10-CM
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
## 2024-04-29 DIAGNOSIS — L98.491: ICD-10-CM

## 2024-04-29 DIAGNOSIS — R11.2 NAUSEA WITH VOMITING, UNSPECIFIED: ICD-10-CM

## 2024-04-29 DIAGNOSIS — F14.20: ICD-10-CM

## 2024-04-29 DIAGNOSIS — Z28.310: ICD-10-CM
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
## 2024-04-29 DIAGNOSIS — Z59.00: ICD-10-CM

## 2024-04-29 DIAGNOSIS — F17.210: ICD-10-CM

## 2024-04-29 DIAGNOSIS — E72.20: ICD-10-CM

## 2024-04-29 DIAGNOSIS — F19.24: ICD-10-CM

## 2024-04-29 DIAGNOSIS — B18.2: ICD-10-CM

## 2024-04-29 DIAGNOSIS — S30.812A: ICD-10-CM
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
## 2024-04-29 PROCEDURE — HZ42ZZZ GROUP COUNSELING FOR SUBSTANCE ABUSE TREATMENT, COGNITIVE-BEHAVIORAL: ICD-10-PCS | Performed by: PSYCHIATRY & NEUROLOGY

## 2024-04-29 RX ADMIN — Medication SCH: at 22:22

## 2024-04-29 RX ADMIN — NICOTINE SCH: 14 PATCH, EXTENDED RELEASE TRANSDERMAL at 13:54

## 2024-04-29 RX ADMIN — Medication SCH: at 13:54

## 2024-04-29 RX ADMIN — NICOTINE SCH MG: 14 PATCH, EXTENDED RELEASE TRANSDERMAL at 13:52

## 2024-04-29 RX ADMIN — Medication SCH TAB: at 13:52

## 2024-04-29 SDOH — ECONOMIC STABILITY - INCOME SECURITY: UNEMPLOYMENT, UNSPECIFIED: Z56.0

## 2024-04-29 SDOH — ECONOMIC STABILITY - HOUSING INSECURITY: HOMELESSNESS UNSPECIFIED: Z59.00

## 2024-04-30 LAB
APPEARANCE UR: CLEAR
BILIRUB UR STRIP.AUTO-MCNC: NEGATIVE MG/DL
COLOR UR: YELLOW
KETONES UR QL STRIP: NEGATIVE
LEUKOCYTE ESTERASE UR QL STRIP.AUTO: NEGATIVE
NITRITE UR QL STRIP: NEGATIVE
PH UR: 6.5 [PH] (ref 5–8)
PROT UR QL STRIP: NEGATIVE
PROT UR QL STRIP: NEGATIVE
SP GR UR: 1.02 (ref 1.01–1.03)
UROBILINOGEN UR STRIP-MCNC: 1 MG/DL (ref 0.2–1)

## 2024-05-03 LAB
ALBUMIN SERPL-MCNC: 3.7 G/DL (ref 3.4–5)
ALP SERPL-CCNC: 96 U/L (ref 45–117)
ALT SERPL-CCNC: 59 U/L (ref 13–61)
ANION GAP SERPL CALC-SCNC: 3 MMOL/L (ref 4–13)
AST SERPL-CCNC: 39 U/L (ref 15–37)
BASOPHILS # BLD: 0.4 % (ref 0–2)
BILIRUB SERPL-MCNC: 0.4 MG/DL (ref 0.2–1)
BUN SERPL-MCNC: 13.5 MG/DL (ref 7–18)
CALCIUM SERPL-MCNC: 9.5 MG/DL (ref 8.5–10.1)
CHLORIDE SERPL-SCNC: 103 MMOL/L (ref 98–107)
CO2 SERPL-SCNC: 27 MMOL/L (ref 21–32)
CREAT SERPL-MCNC: 1 MG/DL (ref 0.55–1.3)
DEPRECATED RDW RBC AUTO: 13.5 % (ref 11.9–15.9)
EOSINOPHIL # BLD: 2.8 % (ref 0–4.5)
GLUCOSE SERPL-MCNC: 127 MG/DL (ref 74–106)
HCT VFR BLD CALC: 42 % (ref 35.4–49)
HGB BLD-MCNC: 14 GM/DL (ref 11.7–16.9)
INR BLD: 1 (ref 0.83–1.09)
LYMPHOCYTES # BLD: 47.2 % (ref 8–40)
MAGNESIUM SERPL-MCNC: 1.9 MG/DL (ref 1.8–2.4)
MCH RBC QN AUTO: 30.5 PG (ref 25.7–33.7)
MCHC RBC AUTO-ENTMCNC: 33.2 G/DL (ref 32–35.9)
MCV RBC: 91.8 FL (ref 80–96)
MONOCYTES # BLD AUTO: 4.6 % (ref 3.8–10.2)
NEUTROPHILS # BLD: 45 % (ref 42.8–82.8)
PLATELET # BLD AUTO: 282 10^3/UL (ref 134–434)
PMV BLD: 7.9 FL (ref 7.5–11.1)
POTASSIUM SERPLBLD-SCNC: 3.8 MMOL/L (ref 3.5–5.1)
PROT SERPL-MCNC: 7.4 G/DL (ref 6.4–8.2)
PT PNL PPP: 11.3 SEC (ref 9.7–13)
RBC # BLD AUTO: 4.58 M/MM3 (ref 4–5.6)
SODIUM SERPL-SCNC: 133 MMOL/L (ref 136–145)
TREPONEMA PALLIDUM AB [UNITS/VOLUME] IN SERUM OR PLASMA BY IMMUNOASSAY: (no result)
WBC # BLD AUTO: 4.2 K/MM3 (ref 4–10)

## 2024-05-06 RX ADMIN — LACTULOSE SCH GM: 20 SOLUTION ORAL at 15:54

## 2024-05-06 RX ADMIN — BACITRACIN ZINC SCH: 500 OINTMENT TOPICAL at 21:32

## 2024-05-06 RX ADMIN — IBUPROFEN PRN MG: 600 TABLET, FILM COATED ORAL at 11:12

## 2024-05-07 RX ADMIN — QUETIAPINE FUMARATE SCH: 100 TABLET ORAL at 21:53

## 2024-05-08 VITALS — TEMPERATURE: 97.3 F

## 2024-05-09 VITALS — HEART RATE: 96 BPM | DIASTOLIC BLOOD PRESSURE: 72 MMHG | SYSTOLIC BLOOD PRESSURE: 115 MMHG | RESPIRATION RATE: 16 BRPM

## 2024-05-21 ENCOUNTER — HOSPITAL ENCOUNTER (INPATIENT)
Dept: HOSPITAL 74 - YASAS | Age: 34
LOS: 1 days | Discharge: HOME | DRG: 773 | End: 2024-05-22
Attending: SURGERY | Admitting: ALLERGY & IMMUNOLOGY
Payer: COMMERCIAL

## 2024-05-21 VITALS — BODY MASS INDEX: 29.4 KG/M2

## 2024-05-21 DIAGNOSIS — F19.24: ICD-10-CM

## 2024-05-21 DIAGNOSIS — F12.20: ICD-10-CM

## 2024-05-21 DIAGNOSIS — F25.0: ICD-10-CM

## 2024-05-21 DIAGNOSIS — Z59.02: ICD-10-CM

## 2024-05-21 DIAGNOSIS — Z88.8: ICD-10-CM

## 2024-05-21 DIAGNOSIS — Z56.0: ICD-10-CM

## 2024-05-21 DIAGNOSIS — F19.282: ICD-10-CM

## 2024-05-21 DIAGNOSIS — F17.210: ICD-10-CM

## 2024-05-21 DIAGNOSIS — F14.20: ICD-10-CM

## 2024-05-21 DIAGNOSIS — F10.10: ICD-10-CM

## 2024-05-21 DIAGNOSIS — F11.20: Primary | ICD-10-CM

## 2024-05-21 PROCEDURE — HZ2ZZZZ DETOXIFICATION SERVICES FOR SUBSTANCE ABUSE TREATMENT: ICD-10-PCS | Performed by: SURGERY

## 2024-05-21 RX ADMIN — Medication SCH: at 22:37

## 2024-05-21 SDOH — ECONOMIC STABILITY - HOUSING INSECURITY: UNSHELTERED HOMELESSNESS: Z59.02

## 2024-05-21 SDOH — ECONOMIC STABILITY - INCOME SECURITY: UNEMPLOYMENT, UNSPECIFIED: Z56.0

## 2024-05-22 VITALS — SYSTOLIC BLOOD PRESSURE: 104 MMHG | RESPIRATION RATE: 19 BRPM | TEMPERATURE: 96.9 F | DIASTOLIC BLOOD PRESSURE: 58 MMHG

## 2024-05-22 VITALS — HEART RATE: 64 BPM

## 2024-05-22 RX ADMIN — Medication SCH: at 10:13

## 2024-06-25 ENCOUNTER — EMERGENCY (EMERGENCY)
Facility: HOSPITAL | Age: 34
LOS: 1 days | Discharge: HOME | End: 2024-06-25
Attending: STUDENT IN AN ORGANIZED HEALTH CARE EDUCATION/TRAINING PROGRAM | Admitting: STUDENT IN AN ORGANIZED HEALTH CARE EDUCATION/TRAINING PROGRAM
Payer: SELF-PAY

## 2024-06-25 DIAGNOSIS — Z56.0 UNEMPLOYMENT, UNSPECIFIED: ICD-10-CM

## 2024-06-25 DIAGNOSIS — Z20.822 CONTACT WITH AND (SUSPECTED) EXPOSURE TO COVID-19: ICD-10-CM

## 2024-06-25 DIAGNOSIS — F12.90 CANNABIS USE, UNSPECIFIED, UNCOMPLICATED: ICD-10-CM

## 2024-06-25 DIAGNOSIS — F11.90 OPIOID USE, UNSPECIFIED, UNCOMPLICATED: ICD-10-CM

## 2024-06-25 DIAGNOSIS — F25.9 SCHIZOAFFECTIVE DISORDER, UNSPECIFIED: ICD-10-CM

## 2024-06-25 DIAGNOSIS — F60.2 ANTISOCIAL PERSONALITY DISORDER: ICD-10-CM

## 2024-06-25 DIAGNOSIS — F19.10 OTHER PSYCHOACTIVE SUBSTANCE ABUSE, UNCOMPLICATED: ICD-10-CM

## 2024-06-25 DIAGNOSIS — F13.90 SEDATIVE, HYPNOTIC, OR ANXIOLYTIC USE, UNSPECIFIED, UNCOMPLICATED: ICD-10-CM

## 2024-06-25 DIAGNOSIS — Z91.148 PATIENT'S OTHER NONCOMPLIANCE WITH MEDICATION REGIMEN FOR OTHER REASON: ICD-10-CM

## 2024-06-25 DIAGNOSIS — F14.90 COCAINE USE, UNSPECIFIED, UNCOMPLICATED: ICD-10-CM

## 2024-06-25 DIAGNOSIS — F17.200 NICOTINE DEPENDENCE, UNSPECIFIED, UNCOMPLICATED: ICD-10-CM

## 2024-06-25 DIAGNOSIS — F31.9 BIPOLAR DISORDER, UNSPECIFIED: ICD-10-CM

## 2024-06-25 DIAGNOSIS — R45.851 SUICIDAL IDEATIONS: ICD-10-CM

## 2024-06-25 DIAGNOSIS — F90.9 ATTENTION-DEFICIT HYPERACTIVITY DISORDER, UNSPECIFIED TYPE: ICD-10-CM

## 2024-06-25 DIAGNOSIS — Z59.00 HOMELESSNESS UNSPECIFIED: ICD-10-CM

## 2024-06-25 DIAGNOSIS — F10.90 ALCOHOL USE, UNSPECIFIED, UNCOMPLICATED: ICD-10-CM

## 2024-06-25 PROCEDURE — 99053 MED SERV 10PM-8AM 24 HR FAC: CPT

## 2024-06-25 PROCEDURE — 99285 EMERGENCY DEPT VISIT HI MDM: CPT

## 2024-06-25 SDOH — ECONOMIC STABILITY - INCOME SECURITY: UNEMPLOYMENT, UNSPECIFIED: Z56.0

## 2024-06-25 SDOH — ECONOMIC STABILITY - HOUSING INSECURITY: HOMELESSNESS UNSPECIFIED: Z59.00

## 2024-06-26 VITALS
HEART RATE: 60 BPM | OXYGEN SATURATION: 99 % | SYSTOLIC BLOOD PRESSURE: 107 MMHG | RESPIRATION RATE: 16 BRPM | TEMPERATURE: 98 F | DIASTOLIC BLOOD PRESSURE: 70 MMHG

## 2024-06-26 VITALS
TEMPERATURE: 98 F | SYSTOLIC BLOOD PRESSURE: 129 MMHG | WEIGHT: 210.1 LBS | DIASTOLIC BLOOD PRESSURE: 78 MMHG | RESPIRATION RATE: 16 BRPM | HEIGHT: 71 IN | HEART RATE: 78 BPM | OXYGEN SATURATION: 97 %

## 2024-06-26 DIAGNOSIS — F19.10 OTHER PSYCHOACTIVE SUBSTANCE ABUSE, UNCOMPLICATED: ICD-10-CM

## 2024-06-26 LAB
ANION GAP SERPL CALC-SCNC: 12 MMOL/L — SIGNIFICANT CHANGE UP (ref 5–17)
APAP SERPL-MCNC: <5 UG/ML — LOW (ref 10–30)
BASOPHILS # BLD AUTO: 0.01 K/UL — SIGNIFICANT CHANGE UP (ref 0–0.2)
BASOPHILS NFR BLD AUTO: 0.2 % — SIGNIFICANT CHANGE UP (ref 0–2)
BUN SERPL-MCNC: 13 MG/DL — SIGNIFICANT CHANGE UP (ref 7–23)
CALCIUM SERPL-MCNC: 9.2 MG/DL — SIGNIFICANT CHANGE UP (ref 8.4–10.5)
CHLORIDE SERPL-SCNC: 104 MMOL/L — SIGNIFICANT CHANGE UP (ref 96–108)
CO2 SERPL-SCNC: 24 MMOL/L — SIGNIFICANT CHANGE UP (ref 22–31)
CREAT SERPL-MCNC: 0.89 MG/DL — SIGNIFICANT CHANGE UP (ref 0.5–1.3)
EGFR: 117 ML/MIN/1.73M2 — SIGNIFICANT CHANGE UP
EOSINOPHIL # BLD AUTO: 0.12 K/UL — SIGNIFICANT CHANGE UP (ref 0–0.5)
EOSINOPHIL NFR BLD AUTO: 1.8 % — SIGNIFICANT CHANGE UP (ref 0–6)
ETHANOL SERPL-MCNC: <10 MG/DL — SIGNIFICANT CHANGE UP (ref 0–10)
FLUAV AG NPH QL: SIGNIFICANT CHANGE UP
FLUBV AG NPH QL: SIGNIFICANT CHANGE UP
GLUCOSE SERPL-MCNC: 98 MG/DL — SIGNIFICANT CHANGE UP (ref 70–99)
HCT VFR BLD CALC: 37.8 % — LOW (ref 39–50)
HGB BLD-MCNC: 13.2 G/DL — SIGNIFICANT CHANGE UP (ref 13–17)
IMM GRANULOCYTES NFR BLD AUTO: 0.2 % — SIGNIFICANT CHANGE UP (ref 0–0.9)
LYMPHOCYTES # BLD AUTO: 3.09 K/UL — SIGNIFICANT CHANGE UP (ref 1–3.3)
LYMPHOCYTES # BLD AUTO: 47.6 % — HIGH (ref 13–44)
MCHC RBC-ENTMCNC: 31.1 PG — SIGNIFICANT CHANGE UP (ref 27–34)
MCHC RBC-ENTMCNC: 34.9 GM/DL — SIGNIFICANT CHANGE UP (ref 32–36)
MCV RBC AUTO: 89.2 FL — SIGNIFICANT CHANGE UP (ref 80–100)
MONOCYTES # BLD AUTO: 0.26 K/UL — SIGNIFICANT CHANGE UP (ref 0–0.9)
MONOCYTES NFR BLD AUTO: 4 % — SIGNIFICANT CHANGE UP (ref 2–14)
NEUTROPHILS # BLD AUTO: 3 K/UL — SIGNIFICANT CHANGE UP (ref 1.8–7.4)
NEUTROPHILS NFR BLD AUTO: 46.2 % — SIGNIFICANT CHANGE UP (ref 43–77)
NRBC # BLD: 0 /100 WBCS — SIGNIFICANT CHANGE UP (ref 0–0)
PLATELET # BLD AUTO: 283 K/UL — SIGNIFICANT CHANGE UP (ref 150–400)
POTASSIUM SERPL-MCNC: 3.4 MMOL/L — LOW (ref 3.5–5.3)
POTASSIUM SERPL-SCNC: 3.4 MMOL/L — LOW (ref 3.5–5.3)
RBC # BLD: 4.24 M/UL — SIGNIFICANT CHANGE UP (ref 4.2–5.8)
RBC # FLD: 12 % — SIGNIFICANT CHANGE UP (ref 10.3–14.5)
RSV RNA NPH QL NAA+NON-PROBE: SIGNIFICANT CHANGE UP
SALICYLATES SERPL-MCNC: <0.3 MG/DL — LOW (ref 2.8–20)
SARS-COV-2 RNA SPEC QL NAA+PROBE: SIGNIFICANT CHANGE UP
SODIUM SERPL-SCNC: 140 MMOL/L — SIGNIFICANT CHANGE UP (ref 135–145)
WBC # BLD: 6.49 K/UL — SIGNIFICANT CHANGE UP (ref 3.8–10.5)
WBC # FLD AUTO: 6.49 K/UL — SIGNIFICANT CHANGE UP (ref 3.8–10.5)

## 2024-06-26 PROCEDURE — 85025 COMPLETE CBC W/AUTO DIFF WBC: CPT

## 2024-06-26 PROCEDURE — 80307 DRUG TEST PRSMV CHEM ANLYZR: CPT

## 2024-06-26 PROCEDURE — 80048 BASIC METABOLIC PNL TOTAL CA: CPT

## 2024-06-26 PROCEDURE — 36415 COLL VENOUS BLD VENIPUNCTURE: CPT

## 2024-06-26 PROCEDURE — 99284 EMERGENCY DEPT VISIT MOD MDM: CPT

## 2024-06-26 PROCEDURE — 90792 PSYCH DIAG EVAL W/MED SRVCS: CPT | Mod: 95

## 2024-06-26 PROCEDURE — 87637 SARSCOV2&INF A&B&RSV AMP PRB: CPT

## 2024-06-26 NOTE — ED PROVIDER NOTE - OBJECTIVE STATEMENT
34 yo M h/o schizoaffetive do, PSUD (ETOH, crack cocaine) here for SI/AH. Non-compliant on abilify and seroquel. Homeless at the moment. Endorses ETOH and crack cocaine earlier today. No other complaints. States has no plan, just voices telling him to kill himself. States he is hungry.

## 2024-06-26 NOTE — ED PROVIDER NOTE - PHYSICAL EXAMINATION
VITAL SIGNS: I have reviewed nursing notes and confirm.  CONSTITUTIONAL: disheveled, no acute distress   SKIN:  warm and dry, no acute rash.   HEAD:  normocephalic, atraumatic.  EYES: EOM intact; conjunctiva and sclera clear.  ENT: No nasal discharge; airway clear.   NECK: Supple; non tender.  CARD: S1, S2 normal; no murmurs, gallops, or rubs. Regular rate and rhythm.   RESP:  Clear to auscultation b/l, no wheezes, rales or rhonchi.  ABD: Normal bowel sounds; soft; non-distended; non-tender; no guarding/ rebound.  EXT: Normal ROM. No clubbing, cyanosis or edema. 2+ pulses to b/l ue/le.  NEURO: Alert, oriented, grossly unremarkable  PSYCH: Cooperative, calm, + SI and AH

## 2024-06-26 NOTE — ED PROVIDER NOTE - NSFOLLOWUPINSTRUCTIONS_ED_ALL_ED_FT
Suicidal Feelings: How to Help Yourself  Suicide is when you end your own life. Suicidal ideation includes expressing thoughts about, or a preoccupation with, ending your own life. There are many things you can do to help yourself feel better when struggling with these feelings. Many services and people are available to support you and others who struggle with similar feelings.    If you ever feel like you may hurt yourself or others, or have thoughts about taking your own life, get help right away. To get help:  Go to your nearest emergency department.  Call your local emergency services (661 in the [place]  Call the [facility] services helpline (370 in the [place]  Call or text a suicide hotline to speak with a trained counselor. The following suicide hotlines are available in the [place]:  0-105-331-TALK (1-883.243.3694 or 471 in the [place]  1-719-QBMDYZZ (1-335.308.9771).  Text 536187. This is the Crisis Text Line in the [place]  1-355.307.1063. This is a hotline for [place]ian speakers.  1-680.918.9701. This is a hotline for TTY users.  2-972-1-U-JAVIER (1-633.161.8157). This is a hotline for lesbian, orosco, bisexual, transgender, or questioning youth.  For a list of hotlines in [place], visit suicide.org/hotlines/international/bdqwiv-ysxneyv-oqshmjnp.html  Contact a crisis center or a local suicide prevention center. To find a crisis center or suicide prevention center:  Call your local hospital, clinic, community service organization, mental health center, social service provider, or health department. Ask for help with connecting to a crisis center.  For a list of crisis centers in the [place], visit: suicidepreventionlifeline.org  For a list of crisis centers in [place], visit: suicideprevention.ca  How to help yourself feel better  A teen talking with an adult.  Promise yourself that you will not do anything bad or extreme when you have suicidal feelings. Remember the times you have felt hopeful.  Many people have gotten through suicidal thoughts and feelings, and you can too.  If you have had these feelings before, remind yourself that you can get through them again.  Let family, friends, teachers, or counselors know how you are feeling. Do not separate yourself from those who care about you and want to help you.  Talk with someone every day, even if you do not feel like talking to anyone or being with other people.  Face-to-face conversation is best to help them understand your feelings.  Contact a mental health care provider and work with this person regularly.  Make a safety plan that you can follow during a crisis.  Include phone numbers of suicide prevention hotlines, mental health professionals, and trusted friends and family members you can call during an emergency.  Save these numbers on your phone.  If you are thinking of taking a lot of medicine, give your medicine to someone who can give it to you as prescribed.  If you are on antidepressants and are concerned you will overdose, tell your health care provider so that he or she can give you safer medicines.  Try to stick to your routines and follow a schedule every day. Make self-care a priority.  Make a list of realistic goals, and cross them off when you achieve them. Accomplishments can give you a sense of worth.  Wait until you are feeling better before doing things that you find difficult or unpleasant.  Do things that you have always enjoyed to take your mind off your feelings.  Try reading a book, or listening to or playing music.  Spending time outside, in nature, may help you feel better.  Follow these instructions at home:  A sign asking the reader not to drink beer, wine, or hard liquor.   Visit your primary health care provider every year for a physical and a mental health checkup.  Take over-the-counter and prescription medicines only as told by your health care provider.  Ask your health care provider about the possible side effects of any medicines you are taking.  Ask your health care provider about whether suicidal ideation is a possible side effect of any of your medicines.  Learn about suicidal ideation and what increases the risk for the development of suicidal thoughts.  Eat a well-balanced diet, and eat regular meals.  Get plenty of rest.  Exercise if you are able. Just 30 minutes of exercise each day can help you feel better.  Keep your living space well lit.  Do not use alcohol or drugs. Remove these substances from your home.  General recommendations  Remove weapons, poisons, knives, and other deadly items from your home.  Work with a mental health care provider as needed.  When you are feeling well, write yourself a letter with tips and support that you can read when you are not feeling well.  Remember that life's difficulties can be sorted out with help. Conditions can be treated, and you can learn behaviors and ways of thinking that will help you.  Work with your health care provider or counselor to learn ways of coping with your thoughts and feelings.  Where to find more information  National Suicide Prevention Lifeline: www.suicidepreventionlifeline.org  Hopeline: www.hopeline.[employer]  American Foundation for Suicide Prevention: www.afsp.org  The Javier Project (for lesbian, orosco, bisexual, transgender, or questioning youth): www.thetrevorproject.org  [employer]: www.nimh.nih.gov/health/topics/suicide-prevention  Suicide Prevention Resources: afsp.org/suicide-prevention-resources  Contact a health care provider if:  You feel as though you are a burden to others.  You feel agitated, angry, vengeful, or have extreme mood swings.  You have withdrawn from family and friends.  You are frequently using drugs or alcohol.  Get help right away if:  You are talking about suicide or wishing to die.  You start making plans for how to commit suicide.  You feel that you have no reason to live.  You start making plans for putting your affairs in order, saying goodbye, or giving your possessions away.  You feel guilt, shame, or unbearable pain, and it seems like there is no way out.  You are engaging in risky behaviors that could lead to death.  If you have any of these thoughts or symptoms, get help right away:  Go to your nearest emergency department or crisis center.  Call emergency services (911 in the [place]  Call or text a suicide crisis helpline.  Summary  Suicide is when you take your own life. Suicidal feelings are thoughts about ending your own life.  Promise yourself that you will not do anything bad or extreme when you have suicidal feelings.  Let family, friends, teachers, or counselors know how you are feeling.  Get help right away if you start making plans for how to commit suicide.  This information is not intended to replace advice given to you by your health care provider. Make sure you discuss any questions you have with your health care provider.    Document Revised: 07/14/2022 Document Reviewed: 04/28/2022  Elsevier Patient Education © 2024 [employer] Inc.

## 2024-06-26 NOTE — ED ADULT NURSE NOTE - OBJECTIVE STATEMENT
32 y/o male pmhx mood disorder, does not currently take any medications or see outpatient psychiatrist, hx psychiatric hospitalization. Presents to the ED c/o SI, HI, AH, VH worsening over the past 5 days. Pt states he has not slept for 5 days. Pt endorses SI plan, "to jump in front of a train." Pt states he is experiencing command hallucinations, "they are telling me to kill myself and hurt others." Endorses "seeing figures and people that aren't there." Endorses "smoking crack today". Denies CP, SOB, HA, F/C, N/V/D, weakness, dizziness, and any other complaints at this time. On exam A&Ox4, RA, ambulatory, NAD. Speaking in clear coherent sentences, respirations are spontaneous and unlabored. No obvious signs of injury. 1:1 initiated, patient in gown, belongings collected and wanded by security. Environment checked for all ligature risks and safety hazards utilizing environmental safety checklist.

## 2024-06-26 NOTE — ED BEHAVIORAL HEALTH ASSESSMENT NOTE - PAST PSYCHOTROPIC MEDICATION
As per ED  Assessment dated 4/19/24: "Divalproex Sodium 500 mg PO TID (12/04/21), Quetiapine Fumarate 50 mg PO qhs (12/04/21), Fluoxetine HCl 20 mg PO daily (09/03/21), Alprazolam 1 mg PO TID (07/28/21 – 08/24/21), Olanzapine 10 mg PO daily (06/21/21), Hydroxyzine Pamoate 25 mg PO QID (06/21/21), Ziprasidone Mesylate Injection 10 mg (01/08/21), Mirtazapine 15 mg PO qHs (11/07/20), Divalproex Sodium  mg PO BID (11/07/20), Alprazolam ER 2 mg PO BID (11/07/20), Lorazepam 1 mg PO BID (10/17/20), Benztropine Mesylate 1 mg PO daily (09/03/21)"

## 2024-06-26 NOTE — ED PROVIDER NOTE - PATIENT PORTAL LINK FT
You can access the FollowMyHealth Patient Portal offered by [facility] by registering at the following website: [email or web address]. By joining [facility]’s FollowMyHealth portal, you will also be able to view your health information using other applications (apps) compatible with our system.

## 2024-06-26 NOTE — ED BEHAVIORAL HEALTH ASSESSMENT NOTE - DETAILS
Self referred Pt declined to complete one with this writer See hpi Reports hearing voices telling him to kill self, but does not appear internally preoccupied

## 2024-06-26 NOTE — ED BEHAVIORAL HEALTH ASSESSMENT NOTE - HPI (INCLUDE ILLNESS QUALITY, SEVERITY, DURATION, TIMING, CONTEXT, MODIFYING FACTORS, ASSOCIATED SIGNS AND SYMPTOMS)
Pt is a 32 yo M, single, undomiciled, non-caregiver, unemployed, per PSYCKES carries numerous substance-related disorders(ccn, cannabis, tobacco, opioid, stimulant, sedatives), Antisocial Personality Disorder, various mood/psychotic disorders(MDD, Schizophrenia, Schizoaffective Disorder, Bipolar Disorder), ADHD, per PSYCKES is a high utilizer of inpatient/ED services, including 12 ED visits for psych/med complaints, multiple psych admissions(last known at [facility] 1/26-1/28/24), with a documented hx of malingering, multiple self-reported suicide attempts(states the last was two years ago - details are unclear), no known violence, +legal hx(reported hx of incarceration), active ccn/crack and Fentanyl use(states he last used yesterday), multiple inpt detox/rehab stays, reports he is not currently connected to care, who self presents reports SI, AH, and VH and endorsed using crack/ccn earlier in the day.    In the ED, the pt has been calm and cooperative and did not warrant PRN meds.    On assessment, the pt was found sleeping comfortably, but was arousable to voice, presents as calm, linear, and future-oriented, with no e/o internal preoccupation or emotional distress, and states he brought himself to the ED, because he's been wanting to end his life for the last two days and states his last attempt was two years ago(which conflicts with his reported hx when seen in the ED on April, 2024 when he said he attempted suicide around March, 2024). He does not report a current plan or intent to end his life when prompted, however, and cites his family as the main reason he wants to live. He also reports a wish to become sober so that his life can "get better" and states he's been actively using crack/ccn and Fentanyl for the last 5+ days, and last used both yesterday. The pt then actively expresses interest in rehab for substance misuse when this writer broaches the subject. The pt states he's also been experiencing AH of "voices and noises", but does not provide additional details of the hallucinations when prompted, and states he's currently hearing the voices during the interview, but does not appear internally preoccupied. Similarly, he confirms experiencing recent VH of "figures and shapes"(of note stated he saw 'figures and people' per ED Adult Nurse Note), but does not further describe the hallucinations when prompted, and states he sees the hallucinations during the interview, but does not appear distracted or preoccupied. He denies current urges to harm or kill others and verbalizes understanding that physically harming or killing others is morally wrong and can have potential legal consequences. The pt states he is not currently adherent with meds and is amenable to receiving outpt referrals to restart the meds.

## 2024-06-26 NOTE — ED BEHAVIORAL HEALTH ASSESSMENT NOTE - NSCURPASTPSYDX_PSY_ALL_CORE
Mood disorder/Psychotic disorder/ADHD/Alcohol/Substance Use disorders/Cluster B Personality disorder/traits

## 2024-06-26 NOTE — ED PROVIDER NOTE - CLINICAL SUMMARY MEDICAL DECISION MAKING FREE TEXT BOX
32 yo M h/o schizoaffetive do, PSUD (ETOH, crack cocaine) here for SI/AH. Non-compliant on abilify and seroquel. Homeless at the moment. Endorses ETOH and crack cocaine earlier today. No other complaints. Disheveled, otherwise benign exam. tolerating oral well. Psych labs unremarkable. on 1:1. Seen by telepsych. Psychiatrically cleared. Gave shelter resources and outpatient psych referrals. Given food. Stable for discharge with strict return precautions. 34 yo M h/o schizoaffetive do, PSUD (ETOH, crack cocaine) here for SI/AH. Non-compliant on abilify and seroquel. Homeless at the moment. Endorses ETOH and crack cocaine earlier today. No other complaints. Disheveled, otherwise benign exam. tolerating oral well. Psych labs unremarkable. on 1:1. Seen by telepsych. Psychiatrically cleared, state chronically high risk given pmh, however low risk at the moment. See psych note. Gave shelter resources and outpatient psych referrals. Given food. Stable for discharge with strict return precautions.

## 2024-06-26 NOTE — ED BEHAVIORAL HEALTH ASSESSMENT NOTE - SUMMARY
Pt is a 32 yo M, single, undomiciled, non-caregiver, unemployed, per PSYCKES carries numerous substance-related disorders(ccn, cannabis, tobacco, opioid, stimulant, sedatives), Antisocial Personality Disorder, various mood/psychotic disorders(MDD, Schizophrenia, Schizoaffective Disorder, Bipolar Disorder), ADHD, per PSYCKES is a high utilizer of inpatient/ED services, including 12 ED visits for psych/med complaints, multiple psych admissions(last known at [facility] 1/26-1/28/24), with a documented hx of malingering, multiple self-reported suicide attempts(states the last was two years ago - details are unclear), no known violence, +legal hx(reported hx of incarceration), active ccn/crack and Fentanyl use(states he last used yesterday), multiple inpt detox/rehab stays, reports he is not currently connected to care, who self presents reports SI, AH, and VH and endorsed using crack/ccn earlier in the day.    The pt's current presentation is similar to that when he was assessed by psychiatry in the ED on 4/19/24 and similarly concerning for polysubstance use disorder and secondary gain - namely for shelter. This is evidenced by the fact he is vague and inconsistent in his describing his psychiatric symptoms and his reported symptoms are inconsistent with his behaviors and MSE in the ED. For instance, despite endorsing ongoing SI, he does not evidence observable signs of acute distress on exam, he presents as future-oriented and help-seeking, and also expresses a strong interest in rehab to become sober, which suggests ongoing motivation towards being alive. Similarly, despite reporting active AH and VH at the time of interview, he was vague and inconsistent in his description of these hallucinations and did not present as internally preoccupied or distractible during the interview. Per chart he reported CAH telling him to harm others, but does not report this or an urge to harm or kill others when interviewed by this writer. Furthermore, he verbalizes a clear understanding that harming or killing others is morally wrong and could have legal consequences.     Given his history, the pt is at a chronically elevated risk of harm given his numerous risk factors(ie prior psych hx/admissions, ongoing polysubstance use, homelessness, hx of incarceration, chronic tx non-adherence, male gender, reported suicide attempts), but currently evidences no observable signs of an acute psychiatric condition(ie psychosis, sonja, or depression) on exam that would be amenable to inpatient psychiatric hospitalization. In addition his risk is mitigated by the fact he presents as linear, calm, and help-seeking, and has remained in good behavioral control. Given these factors, he is not at an increased risk of harm from baseline and currently does not present as an imminent danger to self or other at this time. As such, he does not meet criteria for involuntary psychiatric hospitalization and there is no evidence to suggest that a psychiatric admission would significantly alter the course of his illness. Instead, his risk has been further mitigated in the ED by providing him resources for shelter and referrals for rehab/psychiatric services and instructing him to return to the ED for acute safety concerns in the future. The pt is psychiatrically cleared for discharge. Pt is a 32 yo M, single, undomiciled, non-caregiver, unemployed, per PSYCKES carries numerous substance-related disorders(ccn, cannabis, tobacco, opioid, stimulant, sedatives), Antisocial Personality Disorder, various mood/psychotic disorders(MDD, Schizophrenia, Schizoaffective Disorder, Bipolar Disorder), ADHD, per PSYCKES is a high utilizer of inpatient/ED services, including 12 ED visits for psych/med complaints, multiple psych admissions(last known at [facility] 1/26-1/28/24), with a documented hx of malingering, multiple self-reported suicide attempts(states the last was two years ago - details are unclear), no known violence, +legal hx(reported hx of incarceration), active ccn/crack and Fentanyl use(states he last used yesterday), multiple inpt detox/rehab stays, reports he is not currently connected to care, who self presents reports SI, AH, and VH and endorsed using crack/ccn earlier in the day.    The pt's current presentation is similar to that when he was assessed by psychiatry in the ED on 4/19/24 and similarly concerning for polysubstance use disorder and secondary gain - namely for shelter. This is evidenced by the fact he is vague and inconsistent in his describing his psychiatric symptoms and his reported symptoms are inconsistent with his behaviors and MSE in the ED. For instance, despite endorsing ongoing SI, he does not evidence observable signs of acute distress on exam, he presents as future-oriented and help-seeking, and also expresses a strong interest in rehab to become sober, which suggests ongoing motivation towards being alive. Similarly, despite reporting active AH and VH at the time of interview, he was vague and inconsistent in his description of these hallucinations and did not present as internally preoccupied or distractible during the interview. Per chart he reported CAH telling him to harm others, but does not report this or an urge to harm or kill others when interviewed by this writer. Furthermore, he verbalizes a clear understanding that harming or killing others is morally wrong and could have legal consequences.     Given his history, the pt is at a chronically elevated risk of harm given his numerous risk factors(ie prior psych hx/admissions, ongoing polysubstance use, homelessness, hx of incarceration, chronic tx non-adherence, male gender, reported suicide attempts), but currently evidences no observable signs of an acute psychiatric condition(ie psychosis, sonja, or depression) on exam that would be amenable to inpatient psychiatric hospitalization. In addition his risk is mitigated by the fact he presents as linear, calm, and help-seeking, and has remained in good behavioral control. Given these factors, he is not at an increased risk of harm from baseline and currently does not present as an imminent danger to self or other at this time. As such, he does not meet criteria for involuntary psychiatric hospitalization and there is no evidence to suggest that a psychiatric admission would significantly alter the course of his illness. It is notable that the pt may willfully decide to harm himself or others either in an attempt to prove a need for hospitalization or out of spite at not having his initial request met. However, this would not be due to an acute psychiatric condition that could be mitigated through admission but rather a rational decision exercised by an individual who is fully aware of the consequences of his actions. The pt is psychiatrically cleared for discharge.    While in the ED, the pt's risk has been further mitigated in the ED by providing him resources for shelter and referrals for rehab/psychiatric services and instructing him to return to the ED for acute safety concerns in the future. Pt is a 32 yo M, single, undomiciled, non-caregiver, unemployed, per PSYCKES carries numerous substance-related disorders(ccn, cannabis, tobacco, opioid, stimulant, sedatives), Antisocial Personality Disorder, various mood/psychotic disorders(MDD, Schizophrenia, Schizoaffective Disorder, Bipolar Disorder), ADHD, per PSYCKES is a high utilizer of inpatient/ED services, including 12 ED visits for psych/med complaints, multiple psych admissions(last known at [facility] 1/26-1/28/24), with a documented hx of malingering, multiple self-reported suicide attempts(states the last was two years ago - details are unclear), no known violence, +legal hx(reported hx of incarceration), active ccn/crack and Fentanyl use(states he last used yesterday), multiple inpt detox/rehab stays, reports he is not currently connected to care, who self presents reports SI, AH, and VH and endorsed using crack/ccn earlier in the day.    The pt's current presentation is similar to his presentation when he was assessed by psychiatry in the ED on 4/19/24, which was similarly concerning for polysubstance use disorder and secondary gain - namely for shelter. This is evidenced by the fact that he is vague and inconsistent in describing his psychiatric symptoms and his reported symptoms are inconsistent with his behaviors and MSE in the ED. For instance, despite endorsing ongoing SI, he does not evidence observable signs of acute distress on exam, he presents as future-oriented and help-seeking, and also expresses a strong interest in rehab to become sober, which suggests ongoing motivation towards being alive. Similarly, despite reporting active AH and VH at the time of interview, he was vague and inconsistent in his description of these hallucinations and did not present as internally preoccupied or distractible during the interview. Per chart he reported CAH telling him to harm others, but does not report this or an urge to harm or kill others when interviewed by this writer. Furthermore, he verbalizes a clear understanding that harming or killing others is morally wrong and could have legal consequences.     Given his history, the pt is at a chronically elevated risk of harm given his numerous risk factors(ie prior psych hx/admissions, ongoing polysubstance use, homelessness, hx of incarceration, chronic tx non-adherence, male gender, reported suicide attempts), but currently evidences no observable signs of an acute psychiatric condition(ie psychosis, sonja, or depression) on exam that would be amenable to inpatient psychiatric hospitalization. In addition his risk is mitigated by the fact he presents as linear, calm, and help-seeking, and has remained in good behavioral control. Given these factors, he is not at an increased risk of harm from baseline and currently does not present as an imminent danger to self or other at this time. As such, he does not meet criteria for involuntary psychiatric hospitalization and there is no evidence to suggest that a psychiatric admission would significantly alter the course of his illness. It is notable that the pt may willfully decide to harm himself or others either in an attempt to prove a need for hospitalization or out of spite at not having his initial request met. However, this would not be due to an acute psychiatric condition that could be mitigated through admission but rather a rational decision exercised by an individual who is fully aware of the consequences of his actions. The pt is psychiatrically cleared for discharge.    While in the ED, the pt's risk has been further mitigated in the ED by providing him resources for shelter and referrals for rehab/psychiatric services and instructing him to return to the ED for acute safety concerns in the future.

## 2024-07-02 ENCOUNTER — HOSPITAL ENCOUNTER (INPATIENT)
Dept: HOSPITAL 74 - YASAS | Age: 34
LOS: 6 days | Discharge: HOME | DRG: 773 | End: 2024-07-08
Attending: SURGERY | Admitting: ALLERGY & IMMUNOLOGY
Payer: COMMERCIAL

## 2024-07-02 VITALS — BODY MASS INDEX: 25.7 KG/M2

## 2024-07-02 DIAGNOSIS — Z56.0: ICD-10-CM

## 2024-07-02 DIAGNOSIS — F31.9: ICD-10-CM

## 2024-07-02 DIAGNOSIS — F25.9: ICD-10-CM

## 2024-07-02 DIAGNOSIS — F10.230: ICD-10-CM

## 2024-07-02 DIAGNOSIS — R79.89: ICD-10-CM

## 2024-07-02 DIAGNOSIS — Z88.8: ICD-10-CM

## 2024-07-02 DIAGNOSIS — Z59.00: ICD-10-CM

## 2024-07-02 DIAGNOSIS — F17.210: ICD-10-CM

## 2024-07-02 DIAGNOSIS — F11.23: Primary | ICD-10-CM

## 2024-07-02 DIAGNOSIS — F14.20: ICD-10-CM

## 2024-07-02 PROCEDURE — HZ2ZZZZ DETOXIFICATION SERVICES FOR SUBSTANCE ABUSE TREATMENT: ICD-10-PCS | Performed by: SURGERY

## 2024-07-02 RX ADMIN — Medication SCH MG: at 22:51

## 2024-07-02 RX ADMIN — BUPRENORPHINE HYDROCHLORIDE, NALOXONE HYDROCHLORIDE ONE EACH: 2; .5 FILM, SOLUBLE BUCCAL; SUBLINGUAL at 13:46

## 2024-07-02 RX ADMIN — ACAMPROSATE CALCIUM ENTERIC-COATED SCH MG: 333 TABLET, DELAYED RELEASE ORAL at 14:20

## 2024-07-02 RX ADMIN — Medication SCH TAB: at 13:46

## 2024-07-02 RX ADMIN — BUPRENORPHINE HYDROCHLORIDE, NALOXONE HYDROCHLORIDE ONE EACH: 2; .5 FILM, SOLUBLE BUCCAL; SUBLINGUAL at 22:52

## 2024-07-02 SDOH — ECONOMIC STABILITY - INCOME SECURITY: UNEMPLOYMENT, UNSPECIFIED: Z56.0

## 2024-07-02 SDOH — ECONOMIC STABILITY - HOUSING INSECURITY: HOMELESSNESS UNSPECIFIED: Z59.00

## 2024-07-03 LAB
ALBUMIN SERPL-MCNC: 3.4 G/DL (ref 3.4–5)
ALP SERPL-CCNC: 89 U/L (ref 45–117)
ALT SERPL-CCNC: 52 U/L (ref 13–61)
ANION GAP SERPL CALC-SCNC: 6 MMOL/L (ref 4–13)
AST SERPL-CCNC: 28 U/L (ref 15–37)
BILIRUB SERPL-MCNC: 0.4 MG/DL (ref 0.2–1)
BUN SERPL-MCNC: 12.7 MG/DL (ref 7–18)
CALCIUM SERPL-MCNC: 8.7 MG/DL (ref 8.5–10.1)
CHLORIDE SERPL-SCNC: 107 MMOL/L (ref 98–107)
CO2 SERPL-SCNC: 25 MMOL/L (ref 21–32)
CREAT SERPL-MCNC: 0.8 MG/DL (ref 0.55–1.3)
DEPRECATED RDW RBC AUTO: 12.9 % (ref 11.9–15.9)
GLUCOSE SERPL-MCNC: 95 MG/DL (ref 74–106)
HCT VFR BLD CALC: 38.4 % (ref 35.4–49)
HGB BLD-MCNC: 13.3 GM/DL (ref 11.7–16.9)
MCH RBC QN AUTO: 31.2 PG (ref 25.7–33.7)
MCHC RBC AUTO-ENTMCNC: 34.6 G/DL (ref 32–35.9)
MCV RBC: 90 FL (ref 80–96)
PLATELET # BLD AUTO: 262 10^3/UL (ref 134–434)
PMV BLD: 7.6 FL (ref 7.5–11.1)
POTASSIUM SERPLBLD-SCNC: 4.2 MMOL/L (ref 3.5–5.1)
PROT SERPL-MCNC: 6.4 G/DL (ref 6.4–8.2)
RBC # BLD AUTO: 4.26 M/MM3 (ref 4–5.6)
SODIUM SERPL-SCNC: 137 MMOL/L (ref 136–145)
WBC # BLD AUTO: 4.4 K/MM3 (ref 4–10)

## 2024-07-03 RX ADMIN — NICOTINE SCH MG: 21 PATCH TRANSDERMAL at 10:20

## 2024-07-03 RX ADMIN — BUPRENORPHINE HYDROCHLORIDE, NALOXONE HYDROCHLORIDE SCH EACH: 2; .5 FILM, SOLUBLE BUCCAL; SUBLINGUAL at 23:28

## 2024-07-03 RX ADMIN — METHOCARBAMOL PRN MG: 500 TABLET ORAL at 17:48

## 2024-07-03 RX ADMIN — LACTULOSE SCH GM: 20 SOLUTION ORAL at 17:45

## 2024-07-03 RX ADMIN — BUPRENORPHINE HYDROCHLORIDE, NALOXONE HYDROCHLORIDE SCH EACH: 2; .5 FILM, SOLUBLE BUCCAL; SUBLINGUAL at 10:21

## 2024-07-04 RX ADMIN — BUPRENORPHINE HYDROCHLORIDE, NALOXONE HYDROCHLORIDE SCH EACH: 2; .5 FILM, SOLUBLE BUCCAL; SUBLINGUAL at 09:40

## 2024-07-05 RX ADMIN — ACETAMINOPHEN PRN MG: 325 TABLET ORAL at 17:28

## 2024-07-05 RX ADMIN — BUPRENORPHINE HYDROCHLORIDE, NALOXONE HYDROCHLORIDE SCH EACH: 4; 1 FILM, SOLUBLE BUCCAL; SUBLINGUAL at 09:40

## 2024-07-06 RX ADMIN — BUPRENORPHINE HYDROCHLORIDE, NALOXONE HYDROCHLORIDE SCH EACH: 8; 2 FILM, SOLUBLE BUCCAL; SUBLINGUAL at 10:31

## 2024-07-07 RX ADMIN — IBUPROFEN PRN MG: 600 TABLET, FILM COATED ORAL at 05:41

## 2024-07-07 RX ADMIN — BUPRENORPHINE HYDROCHLORIDE, NALOXONE HYDROCHLORIDE SCH EACH: 8; 2 FILM, SOLUBLE BUCCAL; SUBLINGUAL at 09:48

## 2024-07-08 VITALS
DIASTOLIC BLOOD PRESSURE: 67 MMHG | RESPIRATION RATE: 16 BRPM | SYSTOLIC BLOOD PRESSURE: 114 MMHG | TEMPERATURE: 98.4 F | HEART RATE: 69 BPM

## 2024-07-25 NOTE — ED ADULT TRIAGE NOTE - HISTORY OF COVID-19 VACCINATION
Yes ,[email or web address] [email or web address] [email or web address] [email or web address]

## 2024-07-30 ENCOUNTER — EMERGENCY (EMERGENCY)
Facility: HOSPITAL | Age: 34
LOS: 0 days | Discharge: HOME | End: 2024-07-31
Attending: STUDENT IN AN ORGANIZED HEALTH CARE EDUCATION/TRAINING PROGRAM
Payer: SELF-PAY

## 2024-07-30 VITALS
RESPIRATION RATE: 18 BRPM | OXYGEN SATURATION: 98 % | HEART RATE: 89 BPM | TEMPERATURE: 98 F | DIASTOLIC BLOOD PRESSURE: 65 MMHG | SYSTOLIC BLOOD PRESSURE: 132 MMHG | HEIGHT: 71 IN | WEIGHT: 164.91 LBS

## 2024-07-30 DIAGNOSIS — R45.1 RESTLESSNESS AND AGITATION: ICD-10-CM

## 2024-07-30 DIAGNOSIS — F10.129 ALCOHOL ABUSE WITH INTOXICATION, UNSPECIFIED: ICD-10-CM

## 2024-07-30 PROCEDURE — 99284 EMERGENCY DEPT VISIT MOD MDM: CPT

## 2024-07-30 PROCEDURE — 99053 MED SERV 10PM-8AM 24 HR FAC: CPT

## 2024-07-30 NOTE — ED ADULT TRIAGE NOTE - CHIEF COMPLAINT QUOTE
per EMS pt found at [address] , pt admits to drinking today. pt c/o HELLER denies fall. in triage pt uncooperative refuse to answer question.

## 2024-07-31 VITALS
RESPIRATION RATE: 16 BRPM | DIASTOLIC BLOOD PRESSURE: 70 MMHG | SYSTOLIC BLOOD PRESSURE: 107 MMHG | HEART RATE: 65 BPM | TEMPERATURE: 99 F | OXYGEN SATURATION: 99 %

## 2024-07-31 RX ORDER — CHLORDIAZEPOXIDE HYDROCHLORIDE 10 MG/1
25 CAPSULE ORAL ONCE
Refills: 0 | Status: DISCONTINUED | OUTPATIENT
Start: 2024-07-31 | End: 2024-07-31

## 2024-07-31 RX ADMIN — CHLORDIAZEPOXIDE HYDROCHLORIDE 25 MILLIGRAM(S): 10 CAPSULE ORAL at 04:01

## 2024-07-31 NOTE — ED ADULT NURSE NOTE - NSFALLRISKINTERV_ED_ALL_ED
Assistance OOB with selected safe patient handling equipment if applicable/Assistance with ambulation/Communicate fall risk and risk factors to all staff, patient, and family/Monitor gait and stability/Monitor for mental status changes and reorient to person, place, and time, as needed/Provide visual cue: yellow wristband, yellow gown, etc/Reinforce activity limits and safety measures with patient and family/Toileting schedule using arm’s reach rule for commode and bathroom/Use of alarms - bed, stretcher, chair and/or video monitoring/Call bell, personal items and telephone in reach/Instruct patient to call for assistance before getting out of bed/chair/stretcher/Non-slip footwear applied when patient is off stretcher/[place] to call system/Physically safe environment - no spills, clutter or unnecessary equipment/Purposeful Proactive Rounding/Room/bathroom lighting operational, light cord in reach

## 2024-07-31 NOTE — ED PROVIDER NOTE - PATIENT PORTAL LINK FT
You can access the FollowMyHealth Patient Portal offered by [facility] by registering at the following website: [email or web address]. By joining [facility]’s FollowMyHealth portal, you will also be able to view your health information using other applications (apps) compatible with our system.

## 2024-07-31 NOTE — ED PROVIDER NOTE - CLINICAL SUMMARY MEDICAL DECISION MAKING FREE TEXT BOX
presents with altered mental status, known ETOH user.  +Slurred, sluggish behavior.  likely EtOH intoxication. Airway maintained. Unlikely intracranial bleed, opioid intoxication or coingestion, sepsis, hypothyroidism.  Suspect likely transient course of intoxication with expected  improvement of symptoms as patient metabolizes offending agent.    Workup: POCT glucose, frequent reassessments, pt placed on 1:1 as he is intermittently agitated and attempting to get out of bed but redirectable    update: pt clinically sober, denies any sxs, denies HI/SI/hallucinations. states he is feeling anxious which he states feels like withdrawals are coming, requesting librium. will DC w/ PCP fu

## 2024-07-31 NOTE — ED PROVIDER NOTE - NSCAREINITIATED _GEN_ER
[facility]    [place] Discharge Summary    Date of Admission:  2022  7:13 PM  Date of Discharge:  2022    Primary Care Physician   Primary care provider: Physician No Ref-Primary    Discharge Diagnoses   Patient Active Problem List   Diagnosis     Liveborn infant       Hospital Course   Male-Lola Mireles is a Term  appropriate for gestational age male   who was born at 2021 7:13 PM by  Vaginal, Spontaneous.    Hearing screen:  Hearing Screen Date: 22   Hearing Screen Date: 22  Hearing Screening Method: ABR  Hearing Screen, Left Ear: passed  Hearing Screen, Right Ear: passed     Oxygen Screen/CCHD:  Critical Congen Heart Defect Test Date: 22  Right Hand (%): 96 %  Foot (%): 99 %  Critical Congenital Heart Screen Result: pass       )  Patient Active Problem List   Diagnosis     Liveborn infant       Feeding: Breast feeding going well    Plan:  -Discharge to home with parents  -Follow-up with PCP in 24-48 hrs   -Anticipatory guidance given    Diana Coughlin MD    Consultations This Hospital Stay   OCCUPATIONAL THERAPY PEDS IP CONSULT  LACTATION IP CONSULT  NURSE PRACT  IP CONSULT  CARE MANAGEMENT / SOCIAL WORK IP CONSULT    Discharge Orders      Activity    Developmentally appropriate care and safe sleep practices (infant on back with no use of pillows).     Reason for your hospital stay    Newly born     Follow Up and recommended labs and tests    Follow up with [facility] in 1-2 days     Breastfeeding or formula    Breast feeding 8-12 times in 24 hours based on infant feeding cues or formula feeding 6-12 times in 24 hours based on infant feeding cues.     Pending Results   These results will be followed up by   Unresulted Labs Ordered in the Past 30 Days of this Admission     Date and Time Order Name Status Description    2022  7:49 PM NB metabolic screen In process     2022 12:10 PM Blood Culture Arm, Right Preliminary            Discharge Medications   There are no discharge medications for this patient.    Allergies   No Known Allergies    Immunization History   Immunization History   Administered Date(s) Administered     Hep B, Peds or Adolescent 2022        Significant Results and Procedures       Physical Exam   Vital Signs:  Patient Vitals for the past 24 hrs:   Temp Temp src Pulse Resp SpO2 Weight   06/16/22 1205 97.9  F (36.6  C) Axillary 138 62 99 % --   06/16/22 0817 98.5  F (36.9  C) Axillary 140 60 99 % --   06/16/22 0412 98  F (36.7  C) Axillary 146 64 100 % --   06/16/22 0123 98.3  F (36.8  C) Axillary 148 76 100 % --   06/15/22 2302 -- -- -- -- -- 3.36 kg (7 lb 6.5 oz)   06/15/22 2236 98.1  F (36.7  C) Axillary 146 66 98 % --   06/15/22 1925 98.2  F (36.8  C) Axillary 134 72 98 % --   06/15/22 1615 98.2  F (36.8  C) Axillary 130 70 100 % --     Wt Readings from Last 3 Encounters:   06/15/22 3.36 kg (7 lb 6.5 oz) (45 %, Z= -0.12)*     * Growth percentiles are based on WHO (Boys, 0-2 years) data.     Weight change since birth: -8%    General:  alert and normally responsive  Skin:  no abnormal markings; normal color without significant rash.  No jaundice  Head/Neck:  normal anterior and posterior fontanelle, intact scalp; Neck without masses  Eyes:  normal red reflex, clear conjunctiva  Ears/Nose/Mouth:  intact canals, patent nares, mouth normal  Thorax:  normal contour, clavicles intact  Lungs:  clear, no retractions, no increased work of breathing  Heart:  normal rate, rhythm.  No murmurs.  Normal femoral pulses.  Abdomen:  soft without mass, tenderness, organomegaly, hernia.  Umbilicus normal.  Genitalia:  normal male external genitalia with testes descended bilaterally  Anus:  patent  Trunk/spine:  straight, intact  Muskuloskeletal:  Normal Mayer and Ortolani maneuvers.  intact without deformity.  Normal digits.  Neurologic:  normal, symmetric tone and strength.  normal reflexes.    Data   All laboratory data  reviewed    bilitool    Edwardo Carpenter(Attending)

## 2024-07-31 NOTE — ED ADULT NURSE REASSESSMENT NOTE - NS ED NURSE REASSESS COMMENT FT1
Pt AOx4, observed by RN ambulating w/ steady gait. Pt given PO food and drink. Pt states "I need a Metrocard to go home". Pt provided with Metrocard for discharge.

## 2024-07-31 NOTE — ED ADULT NURSE NOTE - OBJECTIVE STATEMENT
LMTCB Pt AOx43, BIBEMS, accompanied by [employer] to stretcher. As per triage note, pt found outside of [address]. Pt admits to drinking "two [employer] today". Pt presently no c/o at this time, pt denies fall. Respirations even and unlabored. Pt reports daily ETOH use. Pt denies pertinent PMH.

## 2024-07-31 NOTE — ED PROVIDER NOTE - PHYSICAL EXAMINATION
General: clinically intoxicated, verbally aggressive  HEENT: NCAT, Neck supple without meningismus, PERRL, no conjunctival injection  Lungs: CTAB, No wheeze or crackles, No retractions, No increased work of breathing  Heart: S1S2 RRR, No M/R/G, Pules equal Bilaterally in upper and lower extremities distally  Abd: soft, NT/ND, No guarding, No rebound.  No hernias, no palpable masses.  Extrem: FROM in all joints, no gross deformities appreciated, no significant edema noted, No ulcers. Cap refil < 2sec.  Skin: No rash noted, warm dry.  Neuro:  Grossly normal.

## 2024-09-03 ENCOUNTER — EMERGENCY (EMERGENCY)
Facility: HOSPITAL | Age: 34
LOS: 0 days | Discharge: HOME | End: 2024-09-04
Attending: STUDENT IN AN ORGANIZED HEALTH CARE EDUCATION/TRAINING PROGRAM
Payer: COMMERCIAL

## 2024-09-03 VITALS
OXYGEN SATURATION: 95 % | DIASTOLIC BLOOD PRESSURE: 82 MMHG | HEIGHT: 71 IN | TEMPERATURE: 99 F | RESPIRATION RATE: 18 BRPM | HEART RATE: 76 BPM | WEIGHT: 210.1 LBS | SYSTOLIC BLOOD PRESSURE: 130 MMHG

## 2024-09-03 DIAGNOSIS — F39 UNSPECIFIED MOOD [AFFECTIVE] DISORDER: ICD-10-CM

## 2024-09-03 DIAGNOSIS — R11.0 NAUSEA: ICD-10-CM

## 2024-09-03 DIAGNOSIS — F17.200 NICOTINE DEPENDENCE, UNSPECIFIED, UNCOMPLICATED: ICD-10-CM

## 2024-09-03 DIAGNOSIS — R10.9 UNSPECIFIED ABDOMINAL PAIN: ICD-10-CM

## 2024-09-03 DIAGNOSIS — R07.89 OTHER CHEST PAIN: ICD-10-CM

## 2024-09-03 DIAGNOSIS — R53.83 OTHER FATIGUE: ICD-10-CM

## 2024-09-03 DIAGNOSIS — R53.1 WEAKNESS: ICD-10-CM

## 2024-09-03 LAB
ALBUMIN SERPL ELPH-MCNC: 3.2 G/DL — LOW (ref 3.3–5)
ALP SERPL-CCNC: 83 U/L — SIGNIFICANT CHANGE UP (ref 40–120)
ALT FLD-CCNC: 60 U/L — SIGNIFICANT CHANGE UP (ref 12–78)
ANION GAP SERPL CALC-SCNC: 6 MMOL/L — SIGNIFICANT CHANGE UP (ref 5–17)
APPEARANCE UR: CLEAR — SIGNIFICANT CHANGE UP
AST SERPL-CCNC: 60 U/L — HIGH (ref 15–37)
BACTERIA # UR AUTO: ABNORMAL /HPF
BASOPHILS # BLD AUTO: 0.01 K/UL — SIGNIFICANT CHANGE UP (ref 0–0.2)
BASOPHILS NFR BLD AUTO: 0.3 % — SIGNIFICANT CHANGE UP (ref 0–2)
BILIRUB SERPL-MCNC: 0.5 MG/DL — SIGNIFICANT CHANGE UP (ref 0.2–1.2)
BILIRUB UR-MCNC: NEGATIVE — SIGNIFICANT CHANGE UP
BUN SERPL-MCNC: 15 MG/DL — SIGNIFICANT CHANGE UP (ref 7–23)
CALCIUM SERPL-MCNC: 8.7 MG/DL — SIGNIFICANT CHANGE UP (ref 8.5–10.1)
CHLORIDE SERPL-SCNC: 107 MMOL/L — SIGNIFICANT CHANGE UP (ref 96–108)
CO2 SERPL-SCNC: 28 MMOL/L — SIGNIFICANT CHANGE UP (ref 22–31)
COLOR SPEC: YELLOW — SIGNIFICANT CHANGE UP
CREAT SERPL-MCNC: 0.79 MG/DL — SIGNIFICANT CHANGE UP (ref 0.5–1.3)
DIFF PNL FLD: NEGATIVE — SIGNIFICANT CHANGE UP
EGFR: 121 ML/MIN/1.73M2 — SIGNIFICANT CHANGE UP
EOSINOPHIL # BLD AUTO: 0.1 K/UL — SIGNIFICANT CHANGE UP (ref 0–0.5)
EOSINOPHIL NFR BLD AUTO: 2.5 % — SIGNIFICANT CHANGE UP (ref 0–6)
EPI CELLS # UR: PRESENT
ETHANOL SERPL-MCNC: <10 MG/DL — SIGNIFICANT CHANGE UP (ref 0–10)
GLUCOSE SERPL-MCNC: 97 MG/DL — SIGNIFICANT CHANGE UP (ref 70–99)
GLUCOSE UR QL: NEGATIVE MG/DL — SIGNIFICANT CHANGE UP
HCT VFR BLD CALC: 36.5 % — LOW (ref 39–50)
HGB BLD-MCNC: 12.6 G/DL — LOW (ref 13–17)
IMM GRANULOCYTES NFR BLD AUTO: 0 % — SIGNIFICANT CHANGE UP (ref 0–0.9)
KETONES UR-MCNC: NEGATIVE MG/DL — SIGNIFICANT CHANGE UP
LEUKOCYTE ESTERASE UR-ACNC: NEGATIVE — SIGNIFICANT CHANGE UP
LYMPHOCYTES # BLD AUTO: 1.93 K/UL — SIGNIFICANT CHANGE UP (ref 1–3.3)
LYMPHOCYTES # BLD AUTO: 48.7 % — HIGH (ref 13–44)
MCHC RBC-ENTMCNC: 31.7 PG — SIGNIFICANT CHANGE UP (ref 27–34)
MCHC RBC-ENTMCNC: 34.5 G/DL — SIGNIFICANT CHANGE UP (ref 32–36)
MCV RBC AUTO: 91.9 FL — SIGNIFICANT CHANGE UP (ref 80–100)
MONOCYTES # BLD AUTO: 0.29 K/UL — SIGNIFICANT CHANGE UP (ref 0–0.9)
MONOCYTES NFR BLD AUTO: 7.3 % — SIGNIFICANT CHANGE UP (ref 2–14)
NEUTROPHILS # BLD AUTO: 1.63 K/UL — LOW (ref 1.8–7.4)
NEUTROPHILS NFR BLD AUTO: 41.2 % — LOW (ref 43–77)
NITRITE UR-MCNC: NEGATIVE — SIGNIFICANT CHANGE UP
NRBC # BLD: 0 /100 WBCS — SIGNIFICANT CHANGE UP (ref 0–0)
PH UR: 6.5 — SIGNIFICANT CHANGE UP (ref 5–8)
PLATELET # BLD AUTO: 260 K/UL — SIGNIFICANT CHANGE UP (ref 150–400)
POTASSIUM SERPL-MCNC: 4 MMOL/L — SIGNIFICANT CHANGE UP (ref 3.5–5.3)
POTASSIUM SERPL-SCNC: 4 MMOL/L — SIGNIFICANT CHANGE UP (ref 3.5–5.3)
PROT SERPL-MCNC: 6.5 GM/DL — SIGNIFICANT CHANGE UP (ref 6–8.3)
PROT UR-MCNC: NEGATIVE MG/DL — SIGNIFICANT CHANGE UP
RBC # BLD: 3.97 M/UL — LOW (ref 4.2–5.8)
RBC # FLD: 11.6 % — SIGNIFICANT CHANGE UP (ref 10.3–14.5)
RBC CASTS # UR COMP ASSIST: 2 /HPF — SIGNIFICANT CHANGE UP (ref 0–4)
SODIUM SERPL-SCNC: 141 MMOL/L — SIGNIFICANT CHANGE UP (ref 135–145)
SP GR SPEC: 1.02 — SIGNIFICANT CHANGE UP (ref 1–1.03)
TROPONIN I, HIGH SENSITIVITY RESULT: 4.1 NG/L — SIGNIFICANT CHANGE UP
UROBILINOGEN FLD QL: 1 MG/DL — SIGNIFICANT CHANGE UP (ref 0.2–1)
WBC # BLD: 3.96 K/UL — SIGNIFICANT CHANGE UP (ref 3.8–10.5)
WBC # FLD AUTO: 3.96 K/UL — SIGNIFICANT CHANGE UP (ref 3.8–10.5)
WBC UR QL: 2 /HPF — SIGNIFICANT CHANGE UP (ref 0–5)

## 2024-09-03 PROCEDURE — 70450 CT HEAD/BRAIN W/O DYE: CPT | Mod: 26,MC

## 2024-09-03 PROCEDURE — 99284 EMERGENCY DEPT VISIT MOD MDM: CPT

## 2024-09-03 PROCEDURE — 71046 X-RAY EXAM CHEST 2 VIEWS: CPT | Mod: 26

## 2024-09-03 RX ORDER — ACETAMINOPHEN 325 MG/1
975 TABLET ORAL ONCE
Refills: 0 | Status: COMPLETED | OUTPATIENT
Start: 2024-09-03 | End: 2024-09-03

## 2024-09-03 RX ORDER — MAGNESIUM, ALUMINUM HYDROXIDE 200-225/5
30 SUSPENSION, ORAL (FINAL DOSE FORM) ORAL ONCE
Refills: 0 | Status: COMPLETED | OUTPATIENT
Start: 2024-09-03 | End: 2024-09-03

## 2024-09-03 RX ORDER — FAMOTIDINE 10 MG/ML
20 INJECTION INTRAVENOUS ONCE
Refills: 0 | Status: COMPLETED | OUTPATIENT
Start: 2024-09-03 | End: 2024-09-03

## 2024-09-03 RX ORDER — SODIUM CHLORIDE 9 MG/ML
1000 INJECTION INTRAMUSCULAR; INTRAVENOUS; SUBCUTANEOUS ONCE
Refills: 0 | Status: COMPLETED | OUTPATIENT
Start: 2024-09-03 | End: 2024-09-03

## 2024-09-03 RX ADMIN — ACETAMINOPHEN 975 MILLIGRAM(S): 325 TABLET ORAL at 18:47

## 2024-09-03 RX ADMIN — Medication 30 MILLILITER(S): at 18:47

## 2024-09-03 RX ADMIN — SODIUM CHLORIDE 1000 MILLILITER(S): 9 INJECTION INTRAMUSCULAR; INTRAVENOUS; SUBCUTANEOUS at 15:52

## 2024-09-03 RX ADMIN — FAMOTIDINE 20 MILLIGRAM(S): 10 INJECTION INTRAVENOUS at 18:48

## 2024-09-03 NOTE — ED PROVIDER NOTE - NSFOLLOWUPINSTRUCTIONS_ED_ALL_ED_FT
You were seen in the Emergency Department for generalized weakness/tiredness.     1) Advance activity as tolerated.   2) Continue all previously prescribed medications as directed.    3) Follow up with your primary care physician in 24-48 hours - take copies of your results.    4) Return to the Emergency Department for worsening or persistent symptoms, and/or ANY NEW OR CONCERNING SYMPTOMS.     SEEK IMMEDIATE MEDICAL CARE IF YOU HAVE ANY OF THE FOLLOWING SYMPTOMS: shortness of breath, chest pain, fever over 10 days, or lightheadedness/dizziness.

## 2024-09-03 NOTE — ED PROVIDER NOTE - PHYSICAL EXAMINATION
General: Appears tired  Mentation: AAO x 3  psych: mood appropriate  HEENT: airway patent, conjunctivae clear bilaterally  Resp: symmetric chest rise, no resp distress, breath sounds CTA bilaterally  Cardio: RRR, no m/r/g  GI: soft/nondistended/nontender  Neuro: sensation and motor function grossly intact, no tremors, no tongue fasciculations  Skin: no cyanosis, no jaundice  MSK: normal movement of all extremities  Lymph/Vasc: no LE edema

## 2024-09-03 NOTE — ED PROVIDER NOTE - CLINICAL SUMMARY MEDICAL DECISION MAKING FREE TEXT BOX
33-year-old male with a past medical history of polysubstance abuse presenting with generalized weakness. Patient states since yesterday he has been feeling very tired. Patient states 2 days ago he used heroin. Patient states last drink was today at 9 AM. Associated chest discomfort on the right side of the chest described as sharp, nonradiating, constant. Denies difficulty breathing, vomiting, fevers, headache. Physical exam reveals regular heart rate, clear breath sounds bilaterally, soft nontender abdomen, moving all extremities spontaneously, no tremors, no tongue fasciculations. Labs, talk screen, chest x-ray, CT head.

## 2024-09-03 NOTE — ED PROVIDER NOTE - PATIENT PORTAL LINK FT
You can access the FollowMyHealth Patient Portal offered by [facility] by registering at the following website: [email or web address]. By joining [facility]’s FollowMyHealth portal, you will also be able to view your health information using other applications (apps) compatible with our system.

## 2024-09-03 NOTE — ED ADULT TRIAGE NOTE - CHIEF COMPLAINT QUOTE
BIBEMS from front of restaurant sleeping c/o dizziness x4 hours. Denies SOB. PMH Anxiety. Ambulatory with steady gait. FS 90 in triage.

## 2024-09-03 NOTE — ED ADULT NURSE NOTE - OBJECTIVE STATEMENT
33yMA&Ox3 presenting to ED s/p being found asleep outside a restaurant. pt reports that he is currently homeless and was just closing his eyes at the time he was found outside the restaurant. pt reports daily alcohol use (pt reports several beers/day) and frequent heroin use (snorts and injects). pt endorsing current abd pain that is generalized as well as generalized foot pain. pt reports he is feeling dizzy and tired.

## 2024-09-03 NOTE — ED PROVIDER NOTE - ATTENDING CONTRIBUTION TO CARE
I have personally performed a face to face medical and diagnostic evaluation of the patient. I have discussed with and reviewed the Resident's note and agree with the History, ROS, Physical Exam and MDM unless otherwise indicated. A brief summary of my personal evaluation and impression can be found below. I have written the note.

## 2024-09-03 NOTE — ED PROVIDER NOTE - OBJECTIVE STATEMENT
33Y M PMH polysubstance use, mood disorder brought in by EMS with dizziness. Patient reportedly found sleeping outside a business today. Reported dizziness when picked up by EMS. Currently endorsing abdominal pain, nausea, and fatigue. Admits to drinking beer prior to arrival. Also endorsing heroin use 2 days ago.

## 2024-09-03 NOTE — ED ADULT NURSE REASSESSMENT NOTE - NS ED NURSE REASSESS COMMENT FT1
Handoff report received from ADRIENNE Aguirre for shift change. Plan of care reviewed. Pt received resting on stretcher.  Pt aox3, amb at baseline but cuirrently c/o dizziness. IV site & patency inspected and integrity maintained. rt ac 20g US patent. Pt requesting social work, escalated to ANTHONY Mcadams and MD, per ANTHONY Mcadams pt ok to stay until AM for SW consult. Will continue to monitor. Handoff report received from ADRIENNE Agurire for shift change. Plan of care reviewed. Pt received resting on stretcher.  Pt aox3, amb at baseline but cuirrently c/o dizziness. IV site & patency inspected and integrity maintained. rt ac 20g US patent. Pt requesting to stay as nowhere to go home to, escalated to ANTHONY Mcadams and MD, per ANTHONY Mcadams pt will stay until AM for SW consult. Will continue to monitor.

## 2024-09-04 VITALS
SYSTOLIC BLOOD PRESSURE: 97 MMHG | DIASTOLIC BLOOD PRESSURE: 56 MMHG | RESPIRATION RATE: 14 BRPM | TEMPERATURE: 98 F | OXYGEN SATURATION: 98 % | HEART RATE: 84 BPM

## 2024-09-04 LAB
CULTURE RESULTS: SIGNIFICANT CHANGE UP
SPECIMEN SOURCE: SIGNIFICANT CHANGE UP

## 2024-09-04 NOTE — ED ADULT NURSE REASSESSMENT NOTE - NS ED NURSE REASSESS COMMENT FT1
Pt resting on stretcher. Safety maintained. Updates given. Pt asleep, w adequate resp effort. Pt NAD, arousable to voice. Pt pending SW in AM. Will continue to monitor.

## 2024-09-15 ENCOUNTER — HOSPITAL ENCOUNTER (INPATIENT)
Dept: HOSPITAL 74 - YASAS | Age: 34
LOS: 3 days | Discharge: LEFT BEFORE DISCHARGE | DRG: 770 | End: 2024-09-18
Attending: SURGERY | Admitting: ALLERGY & IMMUNOLOGY
Payer: COMMERCIAL

## 2024-09-15 VITALS — BODY MASS INDEX: 29.9 KG/M2

## 2024-09-15 DIAGNOSIS — Z88.8: ICD-10-CM

## 2024-09-15 DIAGNOSIS — F11.23: Primary | ICD-10-CM

## 2024-09-15 DIAGNOSIS — F17.210: ICD-10-CM

## 2024-09-15 DIAGNOSIS — F10.230: ICD-10-CM

## 2024-09-15 DIAGNOSIS — F90.9: ICD-10-CM

## 2024-09-15 DIAGNOSIS — F25.9: ICD-10-CM

## 2024-09-15 DIAGNOSIS — F19.24: ICD-10-CM

## 2024-09-15 DIAGNOSIS — F19.282: ICD-10-CM

## 2024-09-15 PROCEDURE — HZ2ZZZZ DETOXIFICATION SERVICES FOR SUBSTANCE ABUSE TREATMENT: ICD-10-PCS | Performed by: SURGERY

## 2024-09-16 LAB
ALBUMIN SERPL-MCNC: 3.1 G/DL (ref 3.4–5)
ALP SERPL-CCNC: 79 U/L (ref 45–117)
ALT SERPL-CCNC: 55 U/L (ref 13–61)
ANION GAP SERPL CALC-SCNC: 7 MMOL/L (ref 4–13)
AST SERPL-CCNC: 44 U/L (ref 15–37)
BILIRUB SERPL-MCNC: 0.9 MG/DL (ref 0.2–1)
BUN SERPL-MCNC: 13 MG/DL (ref 7–18)
CALCIUM SERPL-MCNC: 8.9 MG/DL (ref 8.5–10.1)
CHLORIDE SERPL-SCNC: 106 MMOL/L (ref 98–107)
CO2 SERPL-SCNC: 26 MMOL/L (ref 21–32)
CREAT SERPL-MCNC: 0.8 MG/DL (ref 0.55–1.3)
DEPRECATED RDW RBC AUTO: 12.7 % (ref 11.9–15.9)
GLUCOSE SERPL-MCNC: 134 MG/DL (ref 74–106)
HCT VFR BLD CALC: 35 % (ref 35.4–49)
HGB BLD-MCNC: 11.9 GM/DL (ref 11.7–16.9)
MCH RBC QN AUTO: 32.3 PG (ref 25.7–33.7)
MCHC RBC AUTO-ENTMCNC: 33.9 G/DL (ref 32–35.9)
MCV RBC: 95.2 FL (ref 80–96)
PLATELET # BLD AUTO: 272 10^3/UL (ref 134–434)
PMV BLD: 7.5 FL (ref 7.5–11.1)
POTASSIUM SERPLBLD-SCNC: 3.7 MMOL/L (ref 3.5–5.1)
PROT SERPL-MCNC: 6.1 G/DL (ref 6.4–8.2)
RBC # BLD AUTO: 3.68 M/MM3 (ref 4–5.6)
SODIUM SERPL-SCNC: 140 MMOL/L (ref 136–145)
WBC # BLD AUTO: 4.3 K/MM3 (ref 4–10)

## 2024-09-16 RX ADMIN — BACITRACIN ZINC SCH: 500 OINTMENT TOPICAL at 10:53

## 2024-09-16 RX ADMIN — Medication SCH MG: at 22:36

## 2024-09-16 RX ADMIN — ACETAMINOPHEN PRN MG: 325 TABLET ORAL at 22:55

## 2024-09-16 RX ADMIN — Medication SCH: at 22:38

## 2024-09-16 RX ADMIN — HYDROXYZINE PAMOATE PRN MG: 25 CAPSULE ORAL at 22:55

## 2024-09-16 RX ADMIN — IBUPROFEN PRN MG: 600 TABLET, FILM COATED ORAL at 10:51

## 2024-09-16 RX ADMIN — Medication SCH TAB: at 10:31

## 2024-09-16 RX ADMIN — NICOTINE SCH MG: 14 PATCH, EXTENDED RELEASE TRANSDERMAL at 10:31

## 2024-09-17 RX ADMIN — WHITE PETROLATUM SCH: 1 OINTMENT TOPICAL at 10:49

## 2024-09-17 RX ADMIN — IBUPROFEN PRN MG: 400 TABLET, FILM COATED ORAL at 12:34

## 2024-09-17 RX ADMIN — ACAMPROSATE CALCIUM ENTERIC-COATED SCH MG: 333 TABLET, DELAYED RELEASE ORAL at 13:14

## 2024-09-17 RX ADMIN — METHOCARBAMOL PRN MG: 500 TABLET ORAL at 13:14

## 2024-09-18 VITALS
TEMPERATURE: 98.9 F | SYSTOLIC BLOOD PRESSURE: 125 MMHG | HEART RATE: 77 BPM | RESPIRATION RATE: 18 BRPM | DIASTOLIC BLOOD PRESSURE: 66 MMHG

## 2024-09-18 RX ADMIN — Medication SCH: at 10:05

## 2024-11-27 ENCOUNTER — HOSPITAL ENCOUNTER (INPATIENT)
Dept: HOSPITAL 74 - YASAS | Age: 34
LOS: 6 days | Discharge: HOME | DRG: 773 | End: 2024-12-03
Attending: SURGERY | Admitting: ALLERGY & IMMUNOLOGY
Payer: COMMERCIAL

## 2024-11-27 VITALS — BODY MASS INDEX: 32.1 KG/M2

## 2024-11-27 DIAGNOSIS — F11.23: Primary | ICD-10-CM

## 2024-11-27 DIAGNOSIS — F19.282: ICD-10-CM

## 2024-11-27 DIAGNOSIS — F17.210: ICD-10-CM

## 2024-11-27 DIAGNOSIS — F90.9: ICD-10-CM

## 2024-11-27 DIAGNOSIS — F19.280: ICD-10-CM

## 2024-11-27 DIAGNOSIS — F10.20: ICD-10-CM

## 2024-11-27 DIAGNOSIS — R56.1: ICD-10-CM

## 2024-11-27 DIAGNOSIS — Z86.11: ICD-10-CM

## 2024-11-27 DIAGNOSIS — F25.9: ICD-10-CM

## 2024-11-27 PROCEDURE — HZ2ZZZZ DETOXIFICATION SERVICES FOR SUBSTANCE ABUSE TREATMENT: ICD-10-PCS | Performed by: SURGERY

## 2024-11-27 RX ADMIN — Medication SCH: at 22:48

## 2024-11-27 RX ADMIN — METHOCARBAMOL ONE MG: 500 TABLET ORAL at 21:15

## 2024-11-28 LAB
ALBUMIN SERPL-MCNC: 3.5 G/DL (ref 3.4–5)
ALP SERPL-CCNC: 98 U/L (ref 45–117)
ALT SERPL-CCNC: 55 U/L (ref 13–61)
ANION GAP SERPL CALC-SCNC: 10 MMOL/L (ref 4–13)
AST SERPL-CCNC: 32 U/L (ref 15–37)
BILIRUB SERPL-MCNC: 0.4 MG/DL (ref 0.2–1)
BUN SERPL-MCNC: 14.1 MG/DL (ref 7–18)
CALCIUM SERPL-MCNC: 9.1 MG/DL (ref 8.5–10.1)
CHLORIDE SERPL-SCNC: 103 MMOL/L (ref 98–107)
CO2 SERPL-SCNC: 28 MMOL/L (ref 21–32)
CREAT SERPL-MCNC: 0.8 MG/DL (ref 0.55–1.3)
DEPRECATED RDW RBC AUTO: 13.9 % (ref 11.9–15.9)
GLUCOSE SERPL-MCNC: 117 MG/DL (ref 74–106)
HCT VFR BLD CALC: 38.1 % (ref 35.4–49)
HGB BLD-MCNC: 12.7 GM/DL (ref 11.7–16.9)
MCH RBC QN AUTO: 30.2 PG (ref 25.7–33.7)
MCHC RBC AUTO-ENTMCNC: 33.3 G/DL (ref 32–35.9)
MCV RBC: 90.5 FL (ref 80–96)
PLATELET # BLD AUTO: 388 10^3/UL (ref 134–434)
PMV BLD: 6.9 FL (ref 7.5–11.1)
POTASSIUM SERPLBLD-SCNC: 3.8 MMOL/L (ref 3.5–5.1)
PROT SERPL-MCNC: 7 G/DL (ref 6.4–8.2)
RBC # BLD AUTO: 4.21 M/MM3 (ref 4–5.6)
SODIUM SERPL-SCNC: 141 MMOL/L (ref 136–145)
WBC # BLD AUTO: 6.5 K/MM3 (ref 4–10)

## 2024-11-28 RX ADMIN — METHOCARBAMOL PRN MG: 500 TABLET ORAL at 10:43

## 2024-11-28 RX ADMIN — HYDROXYZINE PAMOATE PRN MG: 25 CAPSULE ORAL at 10:43

## 2024-11-28 RX ADMIN — Medication SCH TAB: at 10:43

## 2024-12-02 RX ADMIN — NALOXONE HYDROCHLORIDE SCH: 4 SPRAY NASAL at 12:59

## 2024-12-03 VITALS
RESPIRATION RATE: 18 BRPM | HEART RATE: 76 BPM | TEMPERATURE: 97.7 F | DIASTOLIC BLOOD PRESSURE: 77 MMHG | SYSTOLIC BLOOD PRESSURE: 127 MMHG

## 2025-01-08 ENCOUNTER — EMERGENCY (EMERGENCY)
Facility: HOSPITAL | Age: 35
LOS: 1 days | Discharge: HOME | End: 2025-01-08
Attending: STUDENT IN AN ORGANIZED HEALTH CARE EDUCATION/TRAINING PROGRAM | Admitting: STUDENT IN AN ORGANIZED HEALTH CARE EDUCATION/TRAINING PROGRAM
Payer: SELF-PAY

## 2025-01-08 VITALS
TEMPERATURE: 98 F | HEIGHT: 71 IN | RESPIRATION RATE: 18 BRPM | WEIGHT: 259.93 LBS | HEART RATE: 78 BPM | DIASTOLIC BLOOD PRESSURE: 90 MMHG | OXYGEN SATURATION: 100 % | SYSTOLIC BLOOD PRESSURE: 132 MMHG

## 2025-01-08 PROCEDURE — 99053 MED SERV 10PM-8AM 24 HR FAC: CPT

## 2025-01-08 PROCEDURE — 70450 CT HEAD/BRAIN W/O DYE: CPT | Mod: 26

## 2025-01-08 PROCEDURE — 90471 IMMUNIZATION ADMIN: CPT

## 2025-01-08 PROCEDURE — 99285 EMERGENCY DEPT VISIT HI MDM: CPT | Mod: 25

## 2025-01-08 PROCEDURE — 12001 RPR S/N/AX/GEN/TRNK 2.5CM/<: CPT

## 2025-01-08 PROCEDURE — 99284 EMERGENCY DEPT VISIT MOD MDM: CPT | Mod: 25

## 2025-01-08 PROCEDURE — 82962 GLUCOSE BLOOD TEST: CPT

## 2025-01-08 PROCEDURE — 90715 TDAP VACCINE 7 YRS/> IM: CPT

## 2025-01-08 PROCEDURE — 70450 CT HEAD/BRAIN W/O DYE: CPT | Mod: MC

## 2025-01-08 RX ORDER — CLOSTRIDIUM TETANI TOXOID ANTIGEN (FORMALDEHYDE INACTIVATED), CORYNEBACTERIUM DIPHTHERIAE TOXOID ANTIGEN (FORMALDEHYDE INACTIVATED), BORDETELLA PERTUSSIS TOXOID ANTIGEN (GLUTARALDEHYDE INACTIVATED), BORDETELLA PERTUSSIS FILAMENTOUS HEMAGGLUTININ ANTIGEN (FORMALDEHYDE INACTIVATED), BORDETELLA PERTUSSIS PERTACTIN ANTIGEN, AND BORDETELLA PERTUSSIS FIMBRIAE 2/3 ANTIGEN 5; 2; 2.5; 5; 3; 5 [LF]/.5ML; [LF]/.5ML; UG/.5ML; UG/.5ML; UG/.5ML; UG/.5ML
0.5 INJECTION, SUSPENSION INTRAMUSCULAR ONCE
Refills: 0 | Status: COMPLETED | OUTPATIENT
Start: 2025-01-08 | End: 2025-01-08

## 2025-01-08 RX ADMIN — CLOSTRIDIUM TETANI TOXOID ANTIGEN (FORMALDEHYDE INACTIVATED), CORYNEBACTERIUM DIPHTHERIAE TOXOID ANTIGEN (FORMALDEHYDE INACTIVATED), BORDETELLA PERTUSSIS TOXOID ANTIGEN (GLUTARALDEHYDE INACTIVATED), BORDETELLA PERTUSSIS FILAMENTOUS HEMAGGLUTININ ANTIGEN (FORMALDEHYDE INACTIVATED), BORDETELLA PERTUSSIS PERTACTIN ANTIGEN, AND BORDETELLA PERTUSSIS FIMBRIAE 2/3 ANTIGEN 0.5 MILLILITER(S): 5; 2; 2.5; 5; 3; 5 INJECTION, SUSPENSION INTRAMUSCULAR at 05:39

## 2025-01-08 NOTE — ED PROVIDER NOTE - PROGRESS NOTE DETAILS
ct head neg.   wound cleaned, 4 staples placed, tetanus updated  pt now awake, ambulatory, answering questions appropriately. tolerating po. ambulatory to bathroom w steady gait. no si / hi. stable for dc  return to care for staples discussed
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
## 2025-01-08 NOTE — ED PROVIDER NOTE - NSFOLLOWUPINSTRUCTIONS_ED_ALL_ED_FT
After 24 hours you can get the area wet. Do not scrub the area until the staples are removed. Do not brush hair until staples are removed.     Return in 7-10 days for the removal of your staples.     Return to the Emergency Department if the area becomes red, warm, swollen or painful, if you notice redness traveling up towards your body from the site of the laceration, or if you develop a fever/chills.     ....      HEAD INJURY - Your CT head was reviewed and the results are on the pages to follow. You can expect that the area that you hit will be sore for a few days but then it should start to feel better. Get plenty of rest over the next week. Avoid strenuous activity which may put you at risk for another head injury. Follow up with your Primary Care Doctor within one week for a re-evaluation to be sure no other workup is necessary. Return to the Emergency Department for persistent, worsening, or new symptoms including loss of consciousness, dizziness/lightheadedness, severe headache, change in vision or loss of vision, uncontrollable nausea/vomiting, or any other serious concerns.    PAIN MEDICATION - TYLENOL - Take tylenol as needed for pain - 650mg every 4-6 hours as needed for pain, do not exceed 4000mg in 24 hours - THESE MEDICATIONS DO NOT REQUIRE A PRESCRIPTION AND CAN BE PURCHASED IN ANY PHARMACY.
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
## 2025-01-08 NOTE — ED PROVIDER NOTE - PATIENT PORTAL LINK FT
You can access the FollowMyHealth Patient Portal offered by [facility] by registering at the following website: [email or web address]. By joining [employer]’s FollowMyHealth portal, you will also be able to view your health information using other applications (apps) compatible with our system.

## 2025-01-08 NOTE — ED PROVIDER NOTE - PHYSICAL EXAMINATION
Gen: sleeping comfortably on stretcher, alcohol on breath  Skin: warm, dry  HEENT: normocephalic. 2cm jagged / cshaped laceration to left posterior scalp.   ENT - moist mucous membranes, PERRL  Lungs: respirations even and unlabored  CV: extremities warm and well perfused, 2+ radial and DP pulses, good cap refill  Ext: moving all extremities  Vasc: 2+ pulses throughout, capillary refill <2 seconds  Neuro: opens eyes to verbal stimuli
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
## 2025-01-08 NOTE — ED ADULT NURSE NOTE - OBJECTIVE STATEMENT
Received patient ambulatory with chief complaint of head laceration. Denies LOC, vomiting.  On PE, AOX4, speaking full sentences without difficulty. Patient not in active cardiac or respiratory distress, no noted neurologic deficits.  No obvious trauma/injury/deformity noted. Patient oriented to ED area. All needs attended. POC reviewed. Purposeful proactive hourly rounding in progress.

## 2025-01-11 DIAGNOSIS — Z59.00 HOMELESSNESS UNSPECIFIED: ICD-10-CM

## 2025-01-11 DIAGNOSIS — Z23 ENCOUNTER FOR IMMUNIZATION: ICD-10-CM

## 2025-01-11 DIAGNOSIS — F10.129 ALCOHOL ABUSE WITH INTOXICATION, UNSPECIFIED: ICD-10-CM

## 2025-01-11 DIAGNOSIS — S01.01XA LACERATION WITHOUT FOREIGN BODY OF SCALP, INITIAL ENCOUNTER: ICD-10-CM

## 2025-01-11 DIAGNOSIS — S09.90XA UNSPECIFIED INJURY OF HEAD, INITIAL ENCOUNTER: ICD-10-CM
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
## 2025-01-11 SDOH — ECONOMIC STABILITY - HOUSING INSECURITY: HOMELESSNESS UNSPECIFIED: Z59.00
